# Patient Record
Sex: FEMALE | Race: WHITE | NOT HISPANIC OR LATINO | Employment: UNEMPLOYED | URBAN - NONMETROPOLITAN AREA
[De-identification: names, ages, dates, MRNs, and addresses within clinical notes are randomized per-mention and may not be internally consistent; named-entity substitution may affect disease eponyms.]

---

## 2018-03-11 PROBLEM — R07.9 CHEST PAIN: Status: ACTIVE | Noted: 2018-03-11

## 2018-03-12 PROBLEM — R07.9 CHEST PAIN: Status: RESOLVED | Noted: 2018-03-11 | Resolved: 2018-03-12

## 2019-01-25 ENCOUNTER — TRANSCRIBE ORDERS (OUTPATIENT)
Dept: ADMINISTRATIVE | Facility: HOSPITAL | Age: 64
End: 2019-01-25

## 2019-01-25 DIAGNOSIS — N28.89 LEFT KIDNEY MASS: Primary | ICD-10-CM

## 2019-02-08 ENCOUNTER — APPOINTMENT (OUTPATIENT)
Dept: CT IMAGING | Facility: HOSPITAL | Age: 64
End: 2019-02-08

## 2019-02-22 ENCOUNTER — APPOINTMENT (OUTPATIENT)
Dept: CT IMAGING | Facility: HOSPITAL | Age: 64
End: 2019-02-22

## 2019-12-26 ENCOUNTER — APPOINTMENT (OUTPATIENT)
Dept: CT IMAGING | Facility: HOSPITAL | Age: 64
End: 2019-12-26

## 2019-12-26 ENCOUNTER — APPOINTMENT (OUTPATIENT)
Dept: GENERAL RADIOLOGY | Facility: HOSPITAL | Age: 64
End: 2019-12-26

## 2019-12-26 ENCOUNTER — HOSPITAL ENCOUNTER (EMERGENCY)
Facility: HOSPITAL | Age: 64
Discharge: HOME | End: 2019-12-26
Attending: FAMILY MEDICINE | Admitting: FAMILY MEDICINE

## 2019-12-26 VITALS
HEIGHT: 63 IN | RESPIRATION RATE: 16 BRPM | DIASTOLIC BLOOD PRESSURE: 73 MMHG | SYSTOLIC BLOOD PRESSURE: 128 MMHG | OXYGEN SATURATION: 99 % | WEIGHT: 165 LBS | HEART RATE: 73 BPM | TEMPERATURE: 98.2 F | BODY MASS INDEX: 29.23 KG/M2

## 2019-12-26 DIAGNOSIS — G89.18 POST-OP PAIN: Primary | ICD-10-CM

## 2019-12-26 DIAGNOSIS — R07.89 CHEST WALL PAIN: ICD-10-CM

## 2019-12-26 LAB
ALBUMIN SERPL-MCNC: 4.49 G/DL (ref 3.5–5.2)
ALBUMIN/GLOB SERPL: 1.2 G/DL
ALP SERPL-CCNC: 61 U/L (ref 39–117)
ALT SERPL W P-5'-P-CCNC: 26 U/L (ref 1–33)
ANION GAP SERPL CALCULATED.3IONS-SCNC: 13 MMOL/L (ref 5–15)
AST SERPL-CCNC: 23 U/L (ref 1–32)
BASOPHILS # BLD AUTO: 0.05 10*3/MM3 (ref 0–0.2)
BASOPHILS NFR BLD AUTO: 0.9 % (ref 0–1.5)
BILIRUB SERPL-MCNC: 0.2 MG/DL (ref 0.2–1.2)
BUN BLD-MCNC: 22 MG/DL (ref 8–23)
BUN/CREAT SERPL: 21.2 (ref 7–25)
CALCIUM SPEC-SCNC: 10.1 MG/DL (ref 8.6–10.5)
CHLORIDE SERPL-SCNC: 101 MMOL/L (ref 98–107)
CK SERPL-CCNC: 29 U/L (ref 20–180)
CO2 SERPL-SCNC: 27 MMOL/L (ref 22–29)
CREAT BLD-MCNC: 1.04 MG/DL (ref 0.57–1)
DEPRECATED RDW RBC AUTO: 42 FL (ref 37–54)
EOSINOPHIL # BLD AUTO: 0.38 10*3/MM3 (ref 0–0.4)
EOSINOPHIL NFR BLD AUTO: 6.8 % (ref 0.3–6.2)
ERYTHROCYTE [DISTWIDTH] IN BLOOD BY AUTOMATED COUNT: 12.2 % (ref 12.3–15.4)
GFR SERPL CREATININE-BSD FRML MDRD: 54 ML/MIN/1.73
GLOBULIN UR ELPH-MCNC: 3.6 GM/DL
GLUCOSE BLD-MCNC: 128 MG/DL (ref 65–99)
HCT VFR BLD AUTO: 40.7 % (ref 34–46.6)
HGB BLD-MCNC: 12.8 G/DL (ref 12–15.9)
HOLD SPECIMEN: NORMAL
HOLD SPECIMEN: NORMAL
IMM GRANULOCYTES # BLD AUTO: 0.01 10*3/MM3 (ref 0–0.05)
IMM GRANULOCYTES NFR BLD AUTO: 0.2 % (ref 0–0.5)
LYMPHOCYTES # BLD AUTO: 1.8 10*3/MM3 (ref 0.7–3.1)
LYMPHOCYTES NFR BLD AUTO: 32.1 % (ref 19.6–45.3)
MAGNESIUM SERPL-MCNC: 1.8 MG/DL (ref 1.6–2.4)
MCH RBC QN AUTO: 29.2 PG (ref 26.6–33)
MCHC RBC AUTO-ENTMCNC: 31.4 G/DL (ref 31.5–35.7)
MCV RBC AUTO: 92.9 FL (ref 79–97)
MONOCYTES # BLD AUTO: 0.34 10*3/MM3 (ref 0.1–0.9)
MONOCYTES NFR BLD AUTO: 6.1 % (ref 5–12)
NEUTROPHILS # BLD AUTO: 3.02 10*3/MM3 (ref 1.7–7)
NEUTROPHILS NFR BLD AUTO: 53.9 % (ref 42.7–76)
NRBC BLD AUTO-RTO: 0 /100 WBC (ref 0–0.2)
NT-PROBNP SERPL-MCNC: 200.9 PG/ML (ref 5–900)
PLATELET # BLD AUTO: 364 10*3/MM3 (ref 140–450)
PMV BLD AUTO: 10 FL (ref 6–12)
POTASSIUM BLD-SCNC: 4.5 MMOL/L (ref 3.5–5.2)
PROT SERPL-MCNC: 8.1 G/DL (ref 6–8.5)
RBC # BLD AUTO: 4.38 10*6/MM3 (ref 3.77–5.28)
SODIUM BLD-SCNC: 141 MMOL/L (ref 136–145)
TROPONIN T SERPL-MCNC: <0.01 NG/ML (ref 0–0.03)
TROPONIN T SERPL-MCNC: <0.01 NG/ML (ref 0–0.03)
TSH SERPL DL<=0.05 MIU/L-ACNC: 0.9 UIU/ML (ref 0.27–4.2)
WBC NRBC COR # BLD: 5.6 10*3/MM3 (ref 3.4–10.8)
WHOLE BLOOD HOLD SPECIMEN: NORMAL
WHOLE BLOOD HOLD SPECIMEN: NORMAL

## 2019-12-26 PROCEDURE — 85025 COMPLETE CBC W/AUTO DIFF WBC: CPT | Performed by: FAMILY MEDICINE

## 2019-12-26 PROCEDURE — 25010000002 MORPHINE PER 10 MG: Performed by: FAMILY MEDICINE

## 2019-12-26 PROCEDURE — 96374 THER/PROPH/DIAG INJ IV PUSH: CPT

## 2019-12-26 PROCEDURE — 80053 COMPREHEN METABOLIC PANEL: CPT | Performed by: FAMILY MEDICINE

## 2019-12-26 PROCEDURE — 96376 TX/PRO/DX INJ SAME DRUG ADON: CPT

## 2019-12-26 PROCEDURE — 83735 ASSAY OF MAGNESIUM: CPT | Performed by: FAMILY MEDICINE

## 2019-12-26 PROCEDURE — 83880 ASSAY OF NATRIURETIC PEPTIDE: CPT | Performed by: FAMILY MEDICINE

## 2019-12-26 PROCEDURE — 96375 TX/PRO/DX INJ NEW DRUG ADDON: CPT

## 2019-12-26 PROCEDURE — 84484 ASSAY OF TROPONIN QUANT: CPT | Performed by: FAMILY MEDICINE

## 2019-12-26 PROCEDURE — 25010000002 ONDANSETRON PER 1 MG: Performed by: FAMILY MEDICINE

## 2019-12-26 PROCEDURE — 93005 ELECTROCARDIOGRAM TRACING: CPT | Performed by: FAMILY MEDICINE

## 2019-12-26 PROCEDURE — 93010 ELECTROCARDIOGRAM REPORT: CPT | Performed by: INTERNAL MEDICINE

## 2019-12-26 PROCEDURE — 71046 X-RAY EXAM CHEST 2 VIEWS: CPT | Performed by: RADIOLOGY

## 2019-12-26 PROCEDURE — 71260 CT THORAX DX C+: CPT

## 2019-12-26 PROCEDURE — 71260 CT THORAX DX C+: CPT | Performed by: RADIOLOGY

## 2019-12-26 PROCEDURE — 82550 ASSAY OF CK (CPK): CPT | Performed by: FAMILY MEDICINE

## 2019-12-26 PROCEDURE — 0 IOVERSOL 74 % SOLUTION: Performed by: FAMILY MEDICINE

## 2019-12-26 PROCEDURE — 71046 X-RAY EXAM CHEST 2 VIEWS: CPT

## 2019-12-26 PROCEDURE — 99284 EMERGENCY DEPT VISIT MOD MDM: CPT

## 2019-12-26 PROCEDURE — 84443 ASSAY THYROID STIM HORMONE: CPT | Performed by: FAMILY MEDICINE

## 2019-12-26 RX ORDER — MORPHINE SULFATE 2 MG/ML
2 INJECTION, SOLUTION INTRAMUSCULAR; INTRAVENOUS ONCE
Status: COMPLETED | OUTPATIENT
Start: 2019-12-26 | End: 2019-12-26

## 2019-12-26 RX ORDER — OXYCODONE HYDROCHLORIDE AND ACETAMINOPHEN 5; 325 MG/1; MG/1
1 TABLET ORAL ONCE
Status: COMPLETED | OUTPATIENT
Start: 2019-12-26 | End: 2019-12-26

## 2019-12-26 RX ORDER — ONDANSETRON 2 MG/ML
4 INJECTION INTRAMUSCULAR; INTRAVENOUS ONCE
Status: COMPLETED | OUTPATIENT
Start: 2019-12-26 | End: 2019-12-26

## 2019-12-26 RX ORDER — ASPIRIN 325 MG
325 TABLET ORAL ONCE
Status: COMPLETED | OUTPATIENT
Start: 2019-12-26 | End: 2019-12-26

## 2019-12-26 RX ORDER — SODIUM CHLORIDE 0.9 % (FLUSH) 0.9 %
10 SYRINGE (ML) INJECTION AS NEEDED
Status: DISCONTINUED | OUTPATIENT
Start: 2019-12-26 | End: 2019-12-26 | Stop reason: HOSPADM

## 2019-12-26 RX ORDER — HYDROCODONE BITARTRATE AND ACETAMINOPHEN 7.5; 325 MG/1; MG/1
1 TABLET ORAL EVERY 6 HOURS PRN
Qty: 6 TABLET | Refills: 0 | Status: SHIPPED | OUTPATIENT
Start: 2019-12-26

## 2019-12-26 RX ADMIN — OXYCODONE HYDROCHLORIDE AND ACETAMINOPHEN 1 TABLET: 5; 325 TABLET ORAL at 19:27

## 2019-12-26 RX ADMIN — ASPIRIN 325 MG: 325 TABLET ORAL at 16:29

## 2019-12-26 RX ADMIN — ONDANSETRON 4 MG: 2 INJECTION INTRAMUSCULAR; INTRAVENOUS at 16:29

## 2019-12-26 RX ADMIN — MORPHINE SULFATE 2 MG: 2 INJECTION, SOLUTION INTRAMUSCULAR; INTRAVENOUS at 17:40

## 2019-12-26 RX ADMIN — IOVERSOL 90 ML: 741 INJECTION INTRA-ARTERIAL; INTRAVENOUS at 18:38

## 2019-12-26 RX ADMIN — MORPHINE SULFATE 2 MG: 2 INJECTION, SOLUTION INTRAMUSCULAR; INTRAVENOUS at 16:29

## 2022-04-22 ENCOUNTER — HOSPITAL ENCOUNTER (EMERGENCY)
Age: 67
Discharge: HOME | End: 2022-04-22
Attending: EMERGENCY MEDICINE
Payer: COMMERCIAL

## 2022-04-22 VITALS
RESPIRATION RATE: 18 BRPM | DIASTOLIC BLOOD PRESSURE: 95 MMHG | BODY MASS INDEX: 30.12 KG/M2 | HEART RATE: 95 BPM | TEMPERATURE: 98.4 F | WEIGHT: 170 LBS | SYSTOLIC BLOOD PRESSURE: 125 MMHG | HEIGHT: 63 IN | OXYGEN SATURATION: 99 %

## 2022-04-22 DIAGNOSIS — N30.00 ACUTE CYSTITIS WITHOUT HEMATURIA: Primary | ICD-10-CM

## 2022-04-22 LAB
AMORPHOUS: ABNORMAL /HPF
BACTERIA: ABNORMAL /HPF
BILIRUBIN URINE: NEGATIVE
BLOOD, URINE: ABNORMAL
CLARITY: CLEAR
COLOR: YELLOW
EPITHELIAL CELLS, UA: ABNORMAL /HPF (ref 0–5)
GLUCOSE URINE: NEGATIVE MG/DL
KETONES, URINE: NEGATIVE MG/DL
LEUKOCYTE ESTERASE, URINE: ABNORMAL
MICROSCOPIC EXAMINATION: YES
NITRITE, URINE: NEGATIVE
PH UA: 5.5 (ref 5–8)
PROTEIN UA: NEGATIVE MG/DL
RBC UA: ABNORMAL /HPF (ref 0–4)
SPECIFIC GRAVITY UA: >=1.03 (ref 1–1.03)
URINE REFLEX TO CULTURE: YES
URINE TYPE: ABNORMAL
UROBILINOGEN, URINE: 0.2 E.U./DL
WBC UA: ABNORMAL /HPF (ref 0–5)

## 2022-04-22 PROCEDURE — 99283 EMERGENCY DEPT VISIT LOW MDM: CPT

## 2022-04-22 PROCEDURE — 81001 URINALYSIS AUTO W/SCOPE: CPT

## 2022-04-22 PROCEDURE — 6370000000 HC RX 637 (ALT 250 FOR IP): Performed by: EMERGENCY MEDICINE

## 2022-04-22 RX ORDER — CEPHALEXIN 500 MG/1
500 CAPSULE ORAL 2 TIMES DAILY
Qty: 10 CAPSULE | Refills: 0 | Status: SHIPPED | OUTPATIENT
Start: 2022-04-22 | End: 2022-04-27

## 2022-04-22 RX ORDER — CEPHALEXIN 500 MG/1
500 CAPSULE ORAL ONCE
Status: COMPLETED | OUTPATIENT
Start: 2022-04-22 | End: 2022-04-22

## 2022-04-22 RX ADMIN — CEPHALEXIN 500 MG: 500 CAPSULE ORAL at 14:14

## 2022-04-22 ASSESSMENT — ENCOUNTER SYMPTOMS
COUGH: 0
BACK PAIN: 0
SORE THROAT: 0
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
## 2022-04-22 NOTE — ED PROVIDER NOTES
[address] ENCOUNTER          ATTENDING PHYSICIAN NOTE       Date of evaluation: 4/22/2022    Chief Complaint     Dysuria and Abdominal Pain      History of Present Illness     Jayde Machuca is a 73 y.o. female who presents to the emergency department complaining of mild constant nonradiating suprapubic discomfort of roughly a weeks duration, associated with urinary urgency, frequency, and burning with urination. No flank pain. No nausea vomiting. No fevers or chills. No vaginal discharge or vaginal bleeding. Otherwise in her usual state of health. Review of Systems     Review of Systems   HENT: Negative for sore throat. Respiratory: Negative for cough. Cardiovascular: Negative for chest pain. Musculoskeletal: Negative for back pain and myalgias. Neurological: Negative for dizziness. All other systems reviewed and are negative. Past Medical, Surgical, Family, and Social History     She has a past medical history of Anxiety, Chronic kidney disease, stage 3, Chronic neck pain, Drug abuse, GERD (gastroesophageal reflux disease), Glucose intolerance (impaired glucose tolerance), Hiatal hernia, Hyperlipidemia, Hypertension, Opiate addiction ([address] Ventricular fibrillation ([address] and White coat hypertension. She has a past surgical history that includes A-V cardiac pacemaker insertion (12/2011); cervical fusion (2010); Cholecystectomy (6/2012); [place] tooth extraction; and laparotomy (2010). Her family history includes Asthma in her maternal aunt; Cancer in her father; Depression in her maternal grandfather; Diabetes in her mother; Heart Disease in her mother; High Blood Pressure in her maternal aunt, mother, and sister. She reports that she has never smoked. She has never used smokeless tobacco. She reports that she does not drink alcohol and does not use drugs.     Medications     Previous Medications    AMLODIPINE (NORVASC) 5 MG TABLET    Take 1 tablet by mouth daily    BUSPIRONE (BUSPAR) 10 MG TABLET    TAKE 1 TABLET BY MOUTH 3 TIMES A DAY    CLONIDINE (CATAPRES) 0.3 MG TABLET    Take 1 tablet by mouth 2 times daily. FENOFIBRATE (TRICOR) 145 MG TABLET    TAKE 1 TABLET BY MOUTH DAILY. GABAPENTIN (NEURONTIN) 300 MG CAPSULE    Take 1 capsule by mouth 3 times daily. IBUPROFEN (ADVIL;MOTRIN) 600 MG TABLET    Take 1 tablet by mouth every 6 hours as needed for Pain    LISINOPRIL (PRINIVIL;ZESTRIL) 20 MG TABLET    Take 25 mg by mouth daily       Allergies     She is allergic to darvocet [propoxyphene n-acetaminophen], sulfa antibiotics, and ultram [tramadol hcl]. Physical Exam     INITIAL VITALS: BP (!) 124/109   Pulse 94   Temp 98.4 °F (36.9 °C) (Oral)   Resp 17   Ht 5' 3\" (1.6 m)   Wt 170 lb (77.1 kg)   SpO2 94%   BMI 30.11 kg/m²    General: Well developed, well nourished female in no acute distress. HEENT: Sclera white, conjunctiva pink, mucous membranes moist and oropharynx clear  Neck: Supple, no meningismus or JVD  Respirations: Unlabored with symmetric chest rise and fall  CV: Regular rate and rhythm with strong distal pulses  Abd: Mild suprapubic tenderness to deep palpation without rebound guarding or distention  : No CVA tenderness  Musculoskeletal: Moves all extremities with no signs of orthopedic trauma or edema. Skin: Warm and dry with no rashes or lesions. Neuro: Awake and alert with no focal neurologic deficits. Normal speech and gait.   Psych: Mood and affect are normal.    Diagnostic Results     LABS:   Results for orders placed or performed during the hospital encounter of 04/22/22   Urinalysis with Reflex to Culture    Specimen: Urine   Result Value Ref Range    Color, UA Yellow Straw/Yellow    Clarity, UA Clear Clear    Glucose, Ur Negative Negative mg/dL    Bilirubin Urine Negative Negative    Ketones, Urine Negative Negative mg/dL    Specific Gravity, UA >=1.030 1.005 - 1.030    Blood, Urine SMALL (A) Negative    pH, UA 5.5 5.0 - 8.0    Protein, UA Negative Negative mg/dL    Urobilinogen, Urine 0.2 <2.0 E.U./dL    Nitrite, Urine Negative Negative    Leukocyte Esterase, Urine MODERATE (A) Negative    Microscopic Examination YES     Urine Type NotGiven     Urine Reflex to Culture Yes    Microscopic Urinalysis   Result Value Ref Range    WBC, UA 21-50 (A) 0 - 5 /HPF    RBC, UA 3-4 0 - 4 /HPF    Epithelial Cells, UA 6-10 (A) 0 - 5 /HPF    Bacteria, UA 4+ (A) None Seen /HPF    Amorphous, UA 1+ /HPF       ED Course     Nursing Notes, Past Medical Hx, Past Surgical Hx, Social Hx, Allergies, and Family Hx were reviewed. The patient was given the following medications:  Orders Placed This Encounter   Medications    cephALEXin (KEFLEX) capsule 500 mg     Order Specific Question:   Antimicrobial Indications     Answer:   Urinary Tract Infection    cephALEXin (KEFLEX) 500 MG capsule     Sig: Take 1 capsule by mouth 2 times daily for 5 days     Dispense:  10 capsule     Refill:  0     She was seen and examined on arrival to the treatment area. Results explained, aftercare and return precautions discussed. First dose of antibiotics given in the emergency department. MEDICAL DECISION MAKING / ASSESSMENT / Vandana Dwayne is a 73 y.o. female presents to the emergency department with signs and symptoms consistent with urinary tract infection. No evidence of pyelonephritis. She denies vaginal symptoms. I feel she is stable for discharge home with oral antibiotics and appropriate return precautions. No old cultures available for comparison, and she will be started on Keflex based on her allergies. Clinical Impression     1.  Acute cystitis without hematuria        Disposition     PAT[facility]:  [facility]  [address]  [address]  [phone]    As needed, If symptoms worsen      DISCHARGE MEDICATIONS:  New Prescriptions    CEPHALEXIN (KEFLEX) 500 MG CAPSULE    Take 1 capsule by mouth 2 times daily for 5 days       DISPOSITION Discharge - Pending Orders Complete 04/22/2022 02:10:26 PM          Mojgan uRffin MD  04/22/22 7918
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
## 2022-09-28 ENCOUNTER — HOSPITAL ENCOUNTER (INPATIENT)
Age: 67
LOS: 2 days | Discharge: HOME | DRG: 880 | End: 2022-10-01
Attending: STUDENT IN AN ORGANIZED HEALTH CARE EDUCATION/TRAINING PROGRAM | Admitting: STUDENT IN AN ORGANIZED HEALTH CARE EDUCATION/TRAINING PROGRAM
Payer: MEDICARE

## 2022-09-28 ENCOUNTER — APPOINTMENT (OUTPATIENT)
Dept: GENERAL RADIOLOGY | Age: 67
DRG: 880 | End: 2022-09-28
Payer: MEDICARE

## 2022-09-28 DIAGNOSIS — F41.1 ANXIETY STATE: ICD-10-CM

## 2022-09-28 DIAGNOSIS — R07.9 ACUTE CHEST PAIN: Primary | ICD-10-CM

## 2022-09-28 LAB
A/G RATIO: 1.3 (ref 1.1–2.2)
ALBUMIN SERPL-MCNC: 4.1 G/DL (ref 3.4–5)
ALP BLD-CCNC: 60 U/L (ref 40–129)
ALT SERPL-CCNC: 17 U/L (ref 10–40)
ANION GAP SERPL CALCULATED.3IONS-SCNC: 12 MMOL/L (ref 3–16)
AST SERPL-CCNC: 18 U/L (ref 15–37)
BASOPHILS ABSOLUTE: 0 K/UL (ref 0–0.2)
BASOPHILS RELATIVE PERCENT: 0.4 %
BILIRUB SERPL-MCNC: 0.4 MG/DL (ref 0–1)
BUN BLDV-MCNC: 21 MG/DL (ref 7–20)
CALCIUM SERPL-MCNC: 9.5 MG/DL (ref 8.3–10.6)
CHLORIDE BLD-SCNC: 102 MMOL/L (ref 99–110)
CO2: 24 MMOL/L (ref 21–32)
CREAT SERPL-MCNC: 1.1 MG/DL (ref 0.6–1.2)
EOSINOPHILS ABSOLUTE: 0 K/UL (ref 0–0.6)
EOSINOPHILS RELATIVE PERCENT: 0.2 %
GFR AFRICAN AMERICAN: >60
GFR NON-AFRICAN AMERICAN: 50
GLUCOSE BLD-MCNC: 186 MG/DL (ref 70–99)
HCT VFR BLD CALC: 38.4 % (ref 36–48)
HEMOGLOBIN: 13.1 G/DL (ref 12–16)
LIPASE: 21 U/L (ref 13–60)
LYMPHOCYTES ABSOLUTE: 0.9 K/UL (ref 1–5.1)
LYMPHOCYTES RELATIVE PERCENT: 9.1 %
MCH RBC QN AUTO: 30.5 PG (ref 26–34)
MCHC RBC AUTO-ENTMCNC: 34.1 G/DL (ref 31–36)
MCV RBC AUTO: 89.5 FL (ref 80–100)
MONOCYTES ABSOLUTE: 0.6 K/UL (ref 0–1.3)
MONOCYTES RELATIVE PERCENT: 6 %
NEUTROPHILS ABSOLUTE: 8.6 K/UL (ref 1.7–7.7)
NEUTROPHILS RELATIVE PERCENT: 84.3 %
PDW BLD-RTO: 13 % (ref 12.4–15.4)
PLATELET # BLD: 368 K/UL (ref 135–450)
PMV BLD AUTO: 8.1 FL (ref 5–10.5)
POTASSIUM SERPL-SCNC: 3.7 MMOL/L (ref 3.5–5.1)
PRO-BNP: 392 PG/ML (ref 0–124)
RBC # BLD: 4.29 M/UL (ref 4–5.2)
SODIUM BLD-SCNC: 138 MMOL/L (ref 136–145)
TOTAL PROTEIN: 7.2 G/DL (ref 6.4–8.2)
TROPONIN: <0.01 NG/ML
WBC # BLD: 10.2 K/UL (ref 4–11)

## 2022-09-28 PROCEDURE — 36415 COLL VENOUS BLD VENIPUNCTURE: CPT

## 2022-09-28 PROCEDURE — 99285 EMERGENCY DEPT VISIT HI MDM: CPT

## 2022-09-28 PROCEDURE — 83690 ASSAY OF LIPASE: CPT

## 2022-09-28 PROCEDURE — 84484 ASSAY OF TROPONIN QUANT: CPT

## 2022-09-28 PROCEDURE — 80053 COMPREHEN METABOLIC PANEL: CPT

## 2022-09-28 PROCEDURE — 93005 ELECTROCARDIOGRAM TRACING: CPT | Performed by: STUDENT IN AN ORGANIZED HEALTH CARE EDUCATION/TRAINING PROGRAM

## 2022-09-28 PROCEDURE — 83880 ASSAY OF NATRIURETIC PEPTIDE: CPT

## 2022-09-28 PROCEDURE — 6370000000 HC RX 637 (ALT 250 FOR IP): Performed by: PHYSICIAN ASSISTANT

## 2022-09-28 PROCEDURE — 85025 COMPLETE CBC W/AUTO DIFF WBC: CPT

## 2022-09-28 PROCEDURE — 71045 X-RAY EXAM CHEST 1 VIEW: CPT

## 2022-09-28 RX ORDER — ATORVASTATIN CALCIUM 20 MG/1
20 TABLET, FILM COATED ORAL DAILY
Status: ON HOLD | COMMUNITY
End: 2022-10-01 | Stop reason: HOSPADM

## 2022-09-28 RX ORDER — DIAZEPAM 2 MG/1
2 TABLET ORAL ONCE
Status: COMPLETED | OUTPATIENT
Start: 2022-09-28 | End: 2022-09-28

## 2022-09-28 RX ADMIN — DIAZEPAM 2 MG: 2 TABLET ORAL at 23:03

## 2022-09-28 RX ADMIN — NITROGLYCERIN 1 INCH: 20 OINTMENT TOPICAL at 23:03

## 2022-09-28 ASSESSMENT — HEART SCORE: ECG: 1

## 2022-09-28 ASSESSMENT — ENCOUNTER SYMPTOMS
BACK PAIN: 0
VOMITING: 0
STRIDOR: 0
ABDOMINAL PAIN: 0
DIARRHEA: 0
COUGH: 0
COLOR CHANGE: 0
SHORTNESS OF BREATH: 1
CONSTIPATION: 0
WHEEZING: 0
NAUSEA: 0

## 2022-09-28 ASSESSMENT — PAIN - FUNCTIONAL ASSESSMENT: PAIN_FUNCTIONAL_ASSESSMENT: 0-10

## 2022-09-28 ASSESSMENT — PAIN SCALES - GENERAL
PAINLEVEL_OUTOF10: 6
PAINLEVEL_OUTOF10: 4

## 2022-09-29 LAB
AMPHETAMINE SCREEN, URINE: NORMAL
BACTERIA: ABNORMAL /HPF
BARBITURATE SCREEN URINE: NORMAL
BENZODIAZEPINE SCREEN, URINE: NORMAL
BILIRUBIN URINE: NEGATIVE
BLOOD, URINE: ABNORMAL
CANNABINOID SCREEN URINE: NORMAL
CHOLESTEROL, TOTAL: 163 MG/DL (ref 0–199)
CLARITY: ABNORMAL
COCAINE METABOLITE SCREEN URINE: NORMAL
COLOR: YELLOW
COMMENT UA: ABNORMAL
CRYSTALS, UA: ABNORMAL /HPF
EKG ATRIAL RATE: 70 BPM
EKG ATRIAL RATE: 92 BPM
EKG DIAGNOSIS: NORMAL
EKG DIAGNOSIS: NORMAL
EKG P AXIS: 33 DEGREES
EKG P AXIS: 48 DEGREES
EKG P-R INTERVAL: 142 MS
EKG P-R INTERVAL: 156 MS
EKG Q-T INTERVAL: 370 MS
EKG Q-T INTERVAL: 446 MS
EKG QRS DURATION: 72 MS
EKG QRS DURATION: 74 MS
EKG QTC CALCULATION (BAZETT): 457 MS
EKG QTC CALCULATION (BAZETT): 481 MS
EKG R AXIS: 17 DEGREES
EKG R AXIS: 26 DEGREES
EKG T AXIS: 23 DEGREES
EKG T AXIS: 25 DEGREES
EKG VENTRICULAR RATE: 70 BPM
EKG VENTRICULAR RATE: 92 BPM
EPITHELIAL CELLS, UA: 6 /HPF (ref 0–5)
ESTIMATED AVERAGE GLUCOSE: 131.2 MG/DL
FENTANYL SCREEN, URINE: NORMAL
GLUCOSE BLD-MCNC: 124 MG/DL (ref 70–99)
GLUCOSE BLD-MCNC: 127 MG/DL (ref 70–99)
GLUCOSE BLD-MCNC: 149 MG/DL (ref 70–99)
GLUCOSE URINE: 100 MG/DL
HBA1C MFR BLD: 6.2 %
HDLC SERPL-MCNC: 42 MG/DL (ref 40–60)
HYALINE CASTS: 0 /LPF (ref 0–8)
KETONES, URINE: NEGATIVE MG/DL
LACTIC ACID: 2.8 MMOL/L (ref 0.4–2)
LDL CHOLESTEROL CALCULATED: 102 MG/DL
LEUKOCYTE ESTERASE, URINE: ABNORMAL
LV EF: 63 %
LVEF MODALITY: NORMAL
Lab: NORMAL
MAGNESIUM: 1.9 MG/DL (ref 1.8–2.4)
METHADONE SCREEN, URINE: NORMAL
MICROSCOPIC EXAMINATION: YES
NITRITE, URINE: NEGATIVE
OPIATE SCREEN URINE: NORMAL
OXYCODONE URINE: NORMAL
PERFORMED ON: ABNORMAL
PH UA: 5
PH UA: 5 (ref 5–8)
PHENCYCLIDINE SCREEN URINE: NORMAL
PROTEIN UA: NEGATIVE MG/DL
RBC UA: 1 /HPF (ref 0–4)
SPECIFIC GRAVITY UA: 1.02 (ref 1–1.03)
TRIGL SERPL-MCNC: 95 MG/DL (ref 0–150)
TROPONIN: <0.01 NG/ML
TROPONIN: <0.01 NG/ML
TSH REFLEX: 1 UIU/ML (ref 0.27–4.2)
URINE REFLEX TO CULTURE: YES
URINE TYPE: ABNORMAL
UROBILINOGEN, URINE: 0.2 E.U./DL
VLDLC SERPL CALC-MCNC: 19 MG/DL
WBC UA: 31 /HPF (ref 0–5)

## 2022-09-29 PROCEDURE — 87086 URINE CULTURE/COLONY COUNT: CPT

## 2022-09-29 PROCEDURE — 83605 ASSAY OF LACTIC ACID: CPT

## 2022-09-29 PROCEDURE — 93306 TTE W/DOPPLER COMPLETE: CPT

## 2022-09-29 PROCEDURE — 6370000000 HC RX 637 (ALT 250 FOR IP): Performed by: STUDENT IN AN ORGANIZED HEALTH CARE EDUCATION/TRAINING PROGRAM

## 2022-09-29 PROCEDURE — 80307 DRUG TEST PRSMV CHEM ANLYZR: CPT

## 2022-09-29 PROCEDURE — 84484 ASSAY OF TROPONIN QUANT: CPT

## 2022-09-29 PROCEDURE — 6360000002 HC RX W HCPCS: Performed by: STUDENT IN AN ORGANIZED HEALTH CARE EDUCATION/TRAINING PROGRAM

## 2022-09-29 PROCEDURE — 93010 ELECTROCARDIOGRAM REPORT: CPT | Performed by: INTERNAL MEDICINE

## 2022-09-29 PROCEDURE — 83735 ASSAY OF MAGNESIUM: CPT

## 2022-09-29 PROCEDURE — 83036 HEMOGLOBIN GLYCOSYLATED A1C: CPT

## 2022-09-29 PROCEDURE — 80061 LIPID PANEL: CPT

## 2022-09-29 PROCEDURE — 93005 ELECTROCARDIOGRAM TRACING: CPT | Performed by: STUDENT IN AN ORGANIZED HEALTH CARE EDUCATION/TRAINING PROGRAM

## 2022-09-29 PROCEDURE — 81001 URINALYSIS AUTO W/SCOPE: CPT

## 2022-09-29 PROCEDURE — 84443 ASSAY THYROID STIM HORMONE: CPT

## 2022-09-29 PROCEDURE — 2580000003 HC RX 258: Performed by: STUDENT IN AN ORGANIZED HEALTH CARE EDUCATION/TRAINING PROGRAM

## 2022-09-29 PROCEDURE — 36415 COLL VENOUS BLD VENIPUNCTURE: CPT

## 2022-09-29 PROCEDURE — 1200000000 HC SEMI PRIVATE

## 2022-09-29 RX ORDER — DIAZEPAM 2 MG/1
2 TABLET ORAL EVERY 8 HOURS PRN
Status: DISCONTINUED | OUTPATIENT
Start: 2022-09-29 | End: 2022-10-01 | Stop reason: HOSPADM

## 2022-09-29 RX ORDER — LISINOPRIL 10 MG/1
25 TABLET ORAL DAILY
Status: DISCONTINUED | OUTPATIENT
Start: 2022-09-29 | End: 2022-09-29

## 2022-09-29 RX ORDER — MAGNESIUM SULFATE IN WATER 40 MG/ML
2000 INJECTION, SOLUTION INTRAVENOUS PRN
Status: DISCONTINUED | OUTPATIENT
Start: 2022-09-29 | End: 2022-10-01 | Stop reason: HOSPADM

## 2022-09-29 RX ORDER — ATORVASTATIN CALCIUM 40 MG/1
40 TABLET, FILM COATED ORAL NIGHTLY
Status: DISCONTINUED | OUTPATIENT
Start: 2022-09-29 | End: 2022-10-01 | Stop reason: HOSPADM

## 2022-09-29 RX ORDER — SODIUM CHLORIDE 9 MG/ML
INJECTION, SOLUTION INTRAVENOUS PRN
Status: DISCONTINUED | OUTPATIENT
Start: 2022-09-29 | End: 2022-10-01 | Stop reason: HOSPADM

## 2022-09-29 RX ORDER — ONDANSETRON 2 MG/ML
4 INJECTION INTRAMUSCULAR; INTRAVENOUS EVERY 6 HOURS PRN
Status: DISCONTINUED | OUTPATIENT
Start: 2022-09-29 | End: 2022-10-01 | Stop reason: HOSPADM

## 2022-09-29 RX ORDER — POTASSIUM CHLORIDE 7.45 MG/ML
10 INJECTION INTRAVENOUS PRN
Status: DISCONTINUED | OUTPATIENT
Start: 2022-09-29 | End: 2022-10-01 | Stop reason: HOSPADM

## 2022-09-29 RX ORDER — ONDANSETRON 4 MG/1
4 TABLET, ORALLY DISINTEGRATING ORAL EVERY 8 HOURS PRN
Status: DISCONTINUED | OUTPATIENT
Start: 2022-09-29 | End: 2022-10-01 | Stop reason: HOSPADM

## 2022-09-29 RX ORDER — BUSPIRONE HYDROCHLORIDE 5 MG/1
10 TABLET ORAL 3 TIMES DAILY
Status: DISCONTINUED | OUTPATIENT
Start: 2022-09-29 | End: 2022-09-29

## 2022-09-29 RX ORDER — FENOFIBRATE 160 MG/1
160 TABLET ORAL DAILY
Refills: 5 | Status: DISCONTINUED | OUTPATIENT
Start: 2022-09-29 | End: 2022-10-01 | Stop reason: HOSPADM

## 2022-09-29 RX ORDER — LISINOPRIL 10 MG/1
20 TABLET ORAL DAILY
Status: DISCONTINUED | OUTPATIENT
Start: 2022-09-29 | End: 2022-10-01 | Stop reason: HOSPADM

## 2022-09-29 RX ORDER — POLYETHYLENE GLYCOL 3350 17 G/17G
17 POWDER, FOR SOLUTION ORAL DAILY PRN
Status: DISCONTINUED | OUTPATIENT
Start: 2022-09-29 | End: 2022-10-01 | Stop reason: HOSPADM

## 2022-09-29 RX ORDER — INSULIN LISPRO 100 [IU]/ML
0-8 INJECTION, SOLUTION INTRAVENOUS; SUBCUTANEOUS
Status: DISCONTINUED | OUTPATIENT
Start: 2022-09-29 | End: 2022-10-01 | Stop reason: HOSPADM

## 2022-09-29 RX ORDER — SODIUM CHLORIDE, SODIUM LACTATE, POTASSIUM CHLORIDE, CALCIUM CHLORIDE 600; 310; 30; 20 MG/100ML; MG/100ML; MG/100ML; MG/100ML
INJECTION, SOLUTION INTRAVENOUS CONTINUOUS
Status: DISCONTINUED | OUTPATIENT
Start: 2022-09-29 | End: 2022-09-30

## 2022-09-29 RX ORDER — ACETAMINOPHEN 325 MG/1
650 TABLET ORAL EVERY 6 HOURS PRN
Status: DISCONTINUED | OUTPATIENT
Start: 2022-09-29 | End: 2022-10-01 | Stop reason: HOSPADM

## 2022-09-29 RX ORDER — HYDROCHLOROTHIAZIDE 25 MG/1
25 TABLET ORAL DAILY
Status: DISCONTINUED | OUTPATIENT
Start: 2022-09-29 | End: 2022-10-01 | Stop reason: HOSPADM

## 2022-09-29 RX ORDER — ENOXAPARIN SODIUM 100 MG/ML
40 INJECTION SUBCUTANEOUS DAILY
Status: DISCONTINUED | OUTPATIENT
Start: 2022-09-29 | End: 2022-10-01 | Stop reason: HOSPADM

## 2022-09-29 RX ORDER — GABAPENTIN 300 MG/1
300 CAPSULE ORAL 3 TIMES DAILY
Status: DISCONTINUED | OUTPATIENT
Start: 2022-09-29 | End: 2022-10-01 | Stop reason: HOSPADM

## 2022-09-29 RX ORDER — CLONIDINE HYDROCHLORIDE 0.1 MG/1
0.3 TABLET ORAL 2 TIMES DAILY
Status: DISCONTINUED | OUTPATIENT
Start: 2022-09-29 | End: 2022-10-01 | Stop reason: HOSPADM

## 2022-09-29 RX ORDER — AMLODIPINE BESYLATE 5 MG/1
5 TABLET ORAL DAILY
Status: DISCONTINUED | OUTPATIENT
Start: 2022-09-29 | End: 2022-09-29

## 2022-09-29 RX ORDER — GABAPENTIN 600 MG/1
600 TABLET ORAL 2 TIMES DAILY
COMMUNITY

## 2022-09-29 RX ORDER — SODIUM CHLORIDE 0.9 % (FLUSH) 0.9 %
5-40 SYRINGE (ML) INJECTION EVERY 12 HOURS SCHEDULED
Status: DISCONTINUED | OUTPATIENT
Start: 2022-09-29 | End: 2022-10-01 | Stop reason: HOSPADM

## 2022-09-29 RX ORDER — INSULIN LISPRO 100 [IU]/ML
0-4 INJECTION, SOLUTION INTRAVENOUS; SUBCUTANEOUS NIGHTLY
Status: DISCONTINUED | OUTPATIENT
Start: 2022-09-29 | End: 2022-10-01 | Stop reason: HOSPADM

## 2022-09-29 RX ORDER — LISINOPRIL AND HYDROCHLOROTHIAZIDE 25; 20 MG/1; MG/1
1 TABLET ORAL DAILY
COMMUNITY

## 2022-09-29 RX ORDER — DEXTROSE MONOHYDRATE 100 MG/ML
INJECTION, SOLUTION INTRAVENOUS CONTINUOUS PRN
Status: DISCONTINUED | OUTPATIENT
Start: 2022-09-29 | End: 2022-10-01 | Stop reason: HOSPADM

## 2022-09-29 RX ORDER — ACETAMINOPHEN 650 MG/1
650 SUPPOSITORY RECTAL EVERY 6 HOURS PRN
Status: DISCONTINUED | OUTPATIENT
Start: 2022-09-29 | End: 2022-10-01 | Stop reason: HOSPADM

## 2022-09-29 RX ORDER — ATORVASTATIN CALCIUM 40 MG/1
40 TABLET, FILM COATED ORAL DAILY
COMMUNITY

## 2022-09-29 RX ORDER — PANTOPRAZOLE SODIUM 40 MG/1
40 TABLET, DELAYED RELEASE ORAL
Status: DISCONTINUED | OUTPATIENT
Start: 2022-09-29 | End: 2022-10-01 | Stop reason: HOSPADM

## 2022-09-29 RX ORDER — SODIUM CHLORIDE 0.9 % (FLUSH) 0.9 %
5-40 SYRINGE (ML) INJECTION PRN
Status: DISCONTINUED | OUTPATIENT
Start: 2022-09-29 | End: 2022-10-01 | Stop reason: HOSPADM

## 2022-09-29 RX ADMIN — DIAZEPAM 2 MG: 2 TABLET ORAL at 17:17

## 2022-09-29 RX ADMIN — CLONIDINE HYDROCHLORIDE 0.3 MG: 0.1 TABLET ORAL at 08:37

## 2022-09-29 RX ADMIN — FENOFIBRATE 160 MG: 160 TABLET ORAL at 08:38

## 2022-09-29 RX ADMIN — PANTOPRAZOLE SODIUM 40 MG: 40 TABLET, DELAYED RELEASE ORAL at 08:38

## 2022-09-29 RX ADMIN — Medication 10 ML: at 08:38

## 2022-09-29 RX ADMIN — CLONIDINE HYDROCHLORIDE 0.3 MG: 0.1 TABLET ORAL at 20:37

## 2022-09-29 RX ADMIN — SODIUM CHLORIDE, POTASSIUM CHLORIDE, SODIUM LACTATE AND CALCIUM CHLORIDE: 600; 310; 30; 20 INJECTION, SOLUTION INTRAVENOUS at 03:55

## 2022-09-29 RX ADMIN — GABAPENTIN 300 MG: 300 CAPSULE ORAL at 08:38

## 2022-09-29 RX ADMIN — ENOXAPARIN SODIUM 40 MG: 100 INJECTION SUBCUTANEOUS at 08:37

## 2022-09-29 RX ADMIN — GABAPENTIN 300 MG: 300 CAPSULE ORAL at 20:32

## 2022-09-29 RX ADMIN — ATORVASTATIN CALCIUM 40 MG: 40 TABLET, FILM COATED ORAL at 20:32

## 2022-09-29 RX ADMIN — LISINOPRIL 20 MG: 10 TABLET ORAL at 08:38

## 2022-09-29 RX ADMIN — HYDROCHLOROTHIAZIDE 25 MG: 25 TABLET ORAL at 08:37

## 2022-09-29 RX ADMIN — GABAPENTIN 300 MG: 300 CAPSULE ORAL at 14:35

## 2022-09-29 RX ADMIN — DIAZEPAM 2 MG: 2 TABLET ORAL at 08:38

## 2022-09-29 ASSESSMENT — PAIN SCALES - GENERAL
PAINLEVEL_OUTOF10: 0
PAINLEVEL_OUTOF10: 0

## 2022-09-29 NOTE — ED NOTES
Pt is on a continuous pulse oximetry and telemetry monitoring. Pt continued on cycling blood pressure. Fall risk precautions in place, call light in reach, bed side table within reach, bed alarm on, will continue to monitor.            Rafa Puga RN  09/29/22 0763

## 2022-09-29 NOTE — ED NOTES
Pt c/o dizziness. Dr ACOSTA [facility] paged.      James James RN  09/29/22 4007

## 2022-09-29 NOTE — H&P
Hospital Medicine History & Physical        Name:  Jordon Sood  /Age/Sex: 1955  (78 y.o. female)  MRN & CSN:  [id number] & [id number]     PCP: Anali Weinberg MD    Date of Admission: 2022    Date of Service: Pt seen/examined on 2022    Patient Status:  Inpatient - Patient will most likely require more than 48 hours of treatment and requires intensive medical treatment/monitoring     Chief Complaint:    Chief Complaint   Patient presents with    Chest Pain     Pt from home by EMS, pt had chest pain that started 4-5 hours ago. Pt states that chest pain pain feels tight. Pt states that hx of Afib and CHF. Pt has metronic pacemaker. EMS gave her 324 of aspirin and 2 nitro SL. History Of Present Illness:     78 y.o. female with PMHx of chronic kidney disease, history of drug abuse, tobacco abuse, hyperlipidemia, hypertension, s/p implanted defibrillator who presented to [facility] with complaints of chest pain. Patient states a few hours prior to admission she was experiencing chest pressure in the middle of her chest.  She also experienced heart palpitations like her heart was racing. After this went on for little while, patient started experience tremors. She denies shortness of breath, nausea, vomiting, GI/ symptoms. She is never had this chest pressure before, despite patient having history of MI 4 years ago. Dips 1/3 tin per day. No smoking hx. No alcohol use. No ilicit drugs.     Past Medical History:          Diagnosis Date    Anxiety     Chronic kidney disease, stage 3     Dr. Bettie Nunez    Chronic neck pain     Drug abuse 15    Methadone on drug screen / History Cocaine    GERD (gastroesophageal reflux disease)     Glucose intolerance (impaired glucose tolerance) 14    Hiatal hernia     Hyperlipidemia     Hypertension     White Coat    O[facility]ate [address] [address]     Ventricular fibrillation (H[facility])     S/P implanted defibrillator    White coat hypertension Past Surgical History:          Procedure Laterality Date    A-V CARDIAC PACEMAKER INSERTION  12/2011    CERVICAL FUSION  2010    CHOLECYSTECTOMY  6/2012    LAPAROTOMY  2010    WISDOM TOOTH EXTRACTION         Medications Prior to Admission:      Prior to Admission medications    Medication Sig Start Date End Date Taking? Authorizing Provider   atorvastatin (LIPITOR) 20 MG tablet Take 20 mg by mouth daily   Yes Historical Provider, MD   lisinopril (PRINIVIL;ZESTRIL) 20 MG tablet Take 25 mg by mouth daily    Historical Provider, MD   ibuprofen (ADVIL;MOTRIN) 600 MG tablet Take 1 tablet by mouth every 6 hours as needed for Pain 10/18/17   [facility]   busPIRone (BUSPAR) 10 MG tablet TAKE 1 TABLET BY MOUTH 3 TIMES A DAY 6/21/15   Allie Sours, DO   amLODIPine (NORVASC) 5 MG tablet Take 1 tablet by mouth daily 6/17/15   Allie Sours, DO   gabapentin (NEURONTIN) 300 MG capsule Take 1 capsule by mouth 3 times daily. 2/18/15   Darian Mtz, DO   fenofibrate (TRICOR) 145 MG tablet TAKE 1 TABLET BY MOUTH DAILY. 11/20/14   Allie Sours, DO   cloNIDine (CATAPRES) 0.3 MG tablet Take 1 tablet by mouth 2 times daily. 11/20/14   Allie Sours, DO       Allergies:  Darvocet [propoxyphene n-acetaminophen], Sulfa antibiotics, and Ultram [tramadol hcl]    Social History:      The patient currently lives in a motel with her boyfriend as her boyfriend is a . TOBACCO:   reports that she has never smoked. She has never used smokeless tobacco.  ETOH:   reports no history of alcohol use. E-cigarette/Vaping       Questions Responses    E-cigarette/Vaping Use     Start Date     Passive Exposure     Quit Date     Counseling Given     Comments               Family History:       Reviewed and negative in regards to presenting illness/complaint.         Problem Relation Age of Onset    Diabetes Mother     High Blood Pressure Mother     Heart Disease Mother         Arrhythmia    Cancer Father         Lung Cancer    High Blood Pressure Sister     Asthma Maternal Aunt     High Blood Pressure Maternal Aunt     Depression Maternal Grandfather         Suicide       REVIEW OF SYSTEMS COMPLETED:   Pertinent positives as noted in the HPI. All other systems reviewed and negative. PHYSICAL EXAM PERFORMED:    /85   Pulse 73   Temp 98.9 °F (37.2 °C)   Resp 16   SpO2 99%     General appearance:  No apparent distress, appears stated age and cooperative. Anxious. Slightly disheveled. HEENT:  Normal cephalic, atraumatic without obvious deformity. Pupils equal, round, and reactive to light. Extra ocular muscles intact. Conjunctivae/corneas clear. Neck: Supple, with full range of motion. No jugular venous distention. Trachea midline. Respiratory:  Normal respiratory effort. Mild expiratory wheezes noted bilaterally. Cardiovascular: Regular rate and rhythm with normal S1/S2 without murmurs, rubs or gallops. No peripheral edema. Abdomen: Soft, non-tender, non-distended with normal bowel sounds. : No CVA tenderness  Musculoskeletal:  No clubbing or cyanosis. Full range of motion without deformity. Skin: Skin color, texture, turgor normal.  No rashes or lesions. Neurologic:  Neurovascularly intact without any focal sensory/motor deficits. Cranial nerves: II-XII intact, grossly non-focal.  Psychiatric:  Alert and oriented, thought content appropriate, normal insight  Peripheral Pulses: +2 palpable, equal bilaterally       Labs:     Recent Labs     09/28/22 2252   WBC 10.2   HGB 13.1   HCT 38.4        Recent Labs     09/28/22 2252      K 3.7      CO2 24   BUN 21*   CREATININE 1.1   CALCIUM 9.5     Recent Labs     09/28/22 2252   AST 18   ALT 17   BILITOT 0.4   ALKPHOS 60     No results for input(s): INR in the last 72 hours.   Recent Labs     09/28/22 2252   TROPONINI <0.01       Urinalysis:      Lab Results   Component Value Date/Time    NITRU Negative 04/22/2022 12:31 PM WBCUA 21-50 04/22/2022 12:31 PM    BACTERIA 4+ 04/22/2022 12:31 PM    RBCUA 3-4 04/22/2022 12:31 PM    BLOODU SMALL 04/22/2022 12:31 PM    SPECGRAV >=1.030 04/22/2022 12:31 PM    GLUCOSEU Negative 04/22/2022 12:31 PM    GLUCOSEU NEGATIVE 05/15/2012 12:39 AM       Radiology:     CXR: I have reviewed the CXR with the following interpretation: Nonacute  EKG:  I have reviewed the EKG with the following interpretation: NSR    XR CHEST PORTABLE   Final Result   Stable chronic changes no acute abnormality seen. Medications:  Not in a hospital admission.   Current Facility-Administered Medications   Medication Dose Route Frequency Provider Last Rate Last Admin    perflutren lipid microspheres (DEFINITY) injection 1.65 mg  1.5 mL IntraVENous ONCE PRN Rena Vera DO        glucose-vitamin C chewable tablet 4 tablet  4 tablet Oral PRN Baron Vergara,         dextrose bolus 10% 125 mL  125 mL IntraVENous PRN Rena Vera DO        Or    dextrose bolus 10% 250 mL  250 mL IntraVENous PRN Rena Vera DO        glucagon (rDNA) injection 1 mg  1 mg SubCUTAneous PRN Baron Vergara,         dextrose 10 % infusion   IntraVENous Continuous PRN Baron Vergara DO        insulin lispro (HUMALOG) injection vial 0-8 Units  0-8 Units SubCUTAneous TID WC Baron Vergara,         insulin lispro (HUMALOG) injection vial 0-4 Units  0-4 Units SubCUTAneous Nightly Baron Vergara DO        amLODIPine (NORVASC) tablet 5 mg  5 mg Oral Daily Baron Vergara DO        atorvastatin (LIPITOR) tablet 40 mg  40 mg Oral Daily Baron Vergara,         busPIRone (BUSPAR) tablet 10 mg  10 mg Oral TID Rena Vera DO        cloNIDine (CATAPRES) tablet 0.3 mg  0.3 mg Oral BID Rena Vera DO        fenofibrate (TRIGLIDE) tablet 160 mg  160 mg Oral Daily Baron Vergara,         gabapentin (NEURONTIN) capsule 300 mg  300 mg Oral TID Rena Vera DO        lisinopril (PRINIVIL;ZESTRIL) tablet 25 mg  25 mg Oral Daily Rena Vera DO CONSULT TO HOSPITALIST    ASSESSMENT:    Active Hospital Problems    Diagnosis Date Noted    Chest pain [R07.9] 03/11/2018    Hyperlipidemia [E78.5]     GERD (gastroesophageal reflux disease) [K21.9]     Depression [F32. A] 08/23/2012    Ventricular fibrillation ([facility] [address] [address] [I49.01]     Anxiety [F41.9]     Hypertension [I10]          PLAN:  This is a 78 y.o. female who presented to [facility] and is being treated for:    Chest pain  -Likely 2/2 anxiety  -; troponin not elevated  -IVF  -Echo  -Benzo as needed for anxiety  -Daily labs; replace electrolytes as needed  -Hold off on cardiology consult at this time    Hypertension  -Continue home antihypertensives    Hyperlipidemia  -Statin and fenofibrate    Diabetes  -SSI      DVT ppx: Lovenox  GI ppx: PPI  Diet: Diet NPO  Code Status: Full Code    PT/OT Eval Status: ordered    Disposition - home after medical treatment       Elise Ovalle DO  9/29/2022  2:19 AM    Please note that some part of this chart was generated using Dragon dictation software. Although every effort was made to ensure the accuracy of this automated transcription, some errors in transcription may have occurred inadvertently. If you may need any clarification, please do not hesitate to contact me through [facility].

## 2022-09-29 NOTE — PROGRESS NOTES
Pt arrived to 3a from echo/ER at 1420, A&OX4, BP (!) 141/77   Pulse 74   Temp 98.6 °F (37 °C) (Oral)   Resp 16   Wt 170 lb (77.1 kg)   SpO2 94%   BMI 30.11 kg/m²   Home meds taken to pharmacy, pt has no complaints of chest pain at this time, SR on tele, oriented to room and call light.

## 2022-09-29 NOTE — ACP (ADVANCE CARE PLANNING)
Advanced Care Planning Note. Purpose of Encounter: Advanced care planning in light of chest pain admission. Parties In Attendance: Patient,    Decisional Capacity: Yes  Subjective: Chest pain. Anxiety. Objective:   Goals of Care Determination: Patient/POA wishes to seek full treatment. Plan: Echo. IVF. Code Status: Full code   Time spent on Advanced care Plannin minutes  Advanced Care Planning Documents: Completed advanced directives on chart, patient is the POA. Patient's boyfriend, Peter Tobias, would make decisions if patient were incapacitated.     Steve Carrion DO  2022 2:19 AM

## 2022-09-29 NOTE — ED NOTES
Report given to 3A RN. No further questions at this time. Pt to be transported from [place] to  by transport tech.       Katelynn Alcala, CLARENCE  09/29/22 [id number]

## 2022-09-29 NOTE — ED PROVIDER NOTES
[facility]        Pt Name: Brandyn Thrasher  MRN: [id number]  [place] 1955  Date of evaluation: 9/28/2022  Provider: Emily Michelle PA-C  PCP: PROVIDER Magui Hightwoer MD  Note Started: 10:30 PM EDT       WILBER. I have evaluated this patient. My supervising physician was available for consultation. CHIEF COMPLAINT       Chief Complaint   Patient presents with    Chest Pain     Pt from home by EMS, pt had chest pain that started 4-5 hours ago. Pt states that chest pain pain feels tight. Pt states that hx of Afib and CHF. Pt has metronic pacemaker. EMS gave her 324 of aspirin and 2 nitro SL. HISTORY OF PRESENT ILLNESS   (Location, Timing/Onset, Context/Setting, Quality, Duration, Modifying Factors, Severity, Associated Signs and Symptoms)  Note limiting factors. Chief Complaint: Chest pain    Brandyn Thrasher is a 78 y.o. female who presents to the emergency department stating that she has squeezing chest pain starting about 4 hours ago while she was walking. She associates this with shortness of breath, diaphoresis and lightheadedness/dizziness. She felt like she was about to pass out. She does appear very anxious and states that she does have history of terrible anxiety. She is requesting something to calm her nerves. She received full-strength aspirin in route to the emergency department. She received 2 nitroglycerin sublingual prior to arrival.  She states that this did provide some relief for her. She currently rates her pain to be a 6 out of 10 on pain scale. She denies any recent illicit drug use. However does have history of drug abuse. She was diagnosed with covid 19 two weeks ago. Nursing Notes were all reviewed and agreed with or any disagreements were addressed in the HPI.     REVIEW OF SYSTEMS    (2-9 systems for level 4, 10 or more for level 5)     Review of Systems   Constitutional:  Positive for diaphoresis and fatigue. Negative for chills and fever. HENT: Negative. Eyes:  Negative for visual disturbance. Respiratory:  Positive for shortness of breath. Negative for cough, wheezing and stridor. Cardiovascular:  Positive for chest pain. Negative for palpitations and leg swelling. Gastrointestinal:  Negative for abdominal pain, constipation, diarrhea, nausea and vomiting. Genitourinary: Negative. Musculoskeletal:  Negative for back pain, neck pain and neck stiffness. Skin:  Negative for color change, pallor, rash and wound. Neurological:  Positive for dizziness and light-headedness. Negative for tremors, seizures, syncope, facial asymmetry, speech difficulty, weakness, numbness and headaches. Psychiatric/Behavioral:  Negative for confusion. All other systems reviewed and are negative. Positives and Pertinent negatives as per HPI. Except as noted above in the ROS, all other systems were reviewed and negative.        PAST MEDICAL HISTORY     Past Medical History:   Diagnosis Date    Anxiety     Chronic kidney disease, stage 3     Dr. Christen Null    Chronic neck pain     Drug abuse 8-16-14    Methadone on drug screen / History Cocaine    GERD (gastroesophageal reflux disease)     Glucose intolerance (impaired glucose tolerance) 4-12-13    Hiatal hernia     Hyperlipidemia     Hypertension     White Coat    Opiate addiction ([address]     Ventricular fibrillation ([facility] [address] [facility]     S/P implanted defibrillator    White coat hypertension          SURGICAL HISTORY     Past Surgical History:   Procedure Laterality Date    A-V CARDIAC PACEMAKER INSERTION  12/2011    CERVICAL FUSION  2010    CHOLECYSTECTOMY  6/2012    LAPAROTOMY  2010    WISDOM TOOTH EXTRACTION           CURRENTMEDICATIONS       Previous Medications    AMLODIPINE (NORVASC) 5 MG TABLET    Take 1 tablet by mouth daily    ATORVASTATIN (LIPITOR) 20 MG TABLET    Take 20 mg by mouth daily    BUSPIRONE (BUSPAR) 10 MG TABLET    TAKE 1 TABLET BY MOUTH Reviewed   CBC WITH AUTO DIFFERENTIAL - Abnormal; Notable for the following components:       Result Value    Neutrophils Absolute 8.6 (*)     Lymphocytes Absolute 0.9 (*)     All other components within normal limits   COMPREHENSIVE METABOLIC PANEL - Abnormal; Notable for the following components:    Glucose 186 (*)     BUN 21 (*)     GFR Non- 50 (*)     All other components within normal limits   BRAIN NATRIURETIC PEPTIDE - Abnormal; Notable for the following components:    Pro- (*)     All other components within normal limits   LIPASE   TROPONIN   URINE DRUG SCREEN   URINALYSIS WITH REFLEX TO CULTURE       When ordered only abnormal lab results are displayed. All other labs were within normal range or not returned as of this dictation. EKG: When ordered, EKG's are interpreted by the Emergency Department Physician in the absence of a cardiologist.  Please see their note for interpretation of EKG. RADIOLOGY:   Non-plain film images such as CT, Ultrasound and MRI are read by the radiologist. Plain radiographic images are visualized and preliminarily interpreted by the ED Provider with the below findings:        Interpretation per the Radiologist below, if available at the time of this note:    XR CHEST PORTABLE   Final Result   Stable chronic changes no acute abnormality seen. PROCEDURES   Unless otherwise noted below, none     Procedures    CRITICAL CARE TIME   N/a    CONSULTS:  I spoke with Loretakristofer Escamilla, with [employer], at 2300 who interrogated her pacemaker which she clarifies as a dual-chamber ICD. Apparently on September 23, she had 10 episodes of V. tach and 8 episodes of SVT. She has not had any episodes since then and no concerning episodes today.       EMERGENCY DEPARTMENT COURSE and DIFFERENTIAL DIAGNOSIS/MDM:   Vitals:    Vitals:    09/28/22 2227 09/28/22 2229   BP: 138/85    Pulse: 88    Resp: 16    Temp:  98.9 °F (37.2 °C)   TempSrc: Oral    SpO2: 96% Patient was given the following medications:  Medications   diazePAM (VALIUM) tablet 2 mg (2 mg Oral Given 9/28/22 2303)   nitroglycerin (NITRO-BID) 2 % ointment 1 inch (1 inch Topical Given 9/28/22 2303)         Is this patient to be included in the SEP-1 Core Measure due to severe sepsis or septic shock? No   Exclusion criteria - the patient is NOT to be included for SEP-1 Core Measure due to: Infection is not suspected    This patient presents complaining of chest pain. She felt dizzy, diaphoretic and felt like she was going to pass out earlier. She does appear extremely anxious. Does get some relief with nitroglycerin. Valium was given for anxiety. Chest x-ray shows no acute intrathoracic abnormality. She was recently diagnosed with COVID-19 2 weeks ago. Denies any sharp stabbing or pleuritic pain. She is not tachypneic, tachycardic or hypoxic at this time. She has an elevated heart score without any recent cardiac testing. Therefore, I do feel admission is warranted for further evaluation. Her [employer] pacemaker was interrogated. No acute findings today. FINAL IMPRESSION      1. Acute chest pain    2. Anxiety state          DISPOSITION/PLAN   DISPOSITION Decision To Admit 09/28/2022 11:27:36 PM      PATIENT REFERRED TO:  No follow-up provider specified.     DISCHARGE MEDICATIONS:  New Prescriptions    No medications on file       DISCONTINUED MEDICATIONS:  Discontinued Medications    No medications on file              (Please note that portions of this note were completed with a voice recognition program.  Efforts were made to edit the dictations but occasionally words are mis-transcribed.)    Rakel Saxena PA-C (electronically signed)           Rakel Saxena PA-C  09/28/22 9294

## 2022-09-29 NOTE — PROGRESS NOTES
Patient seen in ED, room 26. Admission just initiated when transporter arrived to take patient to echo. All her belongings, including chewing tobacco and clothing gathered and sent with her. Will continue with admission process upon return to the ED. Call placed to pharmacy to validate home medications. Patient states that she currently gets her prescriptions filled at [facility] on [address] and medication list adjusted to reflect these current medication. The pharmacy attached to current med list is [facility] pharmacy on [facility] however, she has not had any prescriptions filled there in the last 90 days. [employer] message sent to physician.

## 2022-09-30 LAB
ALBUMIN SERPL-MCNC: 4.4 G/DL (ref 3.4–5)
ANION GAP SERPL CALCULATED.3IONS-SCNC: 13 MMOL/L (ref 3–16)
ANION GAP SERPL CALCULATED.3IONS-SCNC: 14 MMOL/L (ref 3–16)
BUN BLDV-MCNC: 30 MG/DL (ref 7–20)
BUN BLDV-MCNC: 31 MG/DL (ref 7–20)
CALCIUM SERPL-MCNC: 9.3 MG/DL (ref 8.3–10.6)
CALCIUM SERPL-MCNC: 9.8 MG/DL (ref 8.3–10.6)
CHLORIDE BLD-SCNC: 105 MMOL/L (ref 99–110)
CHLORIDE BLD-SCNC: 105 MMOL/L (ref 99–110)
CO2: 24 MMOL/L (ref 21–32)
CO2: 24 MMOL/L (ref 21–32)
CREAT SERPL-MCNC: 2 MG/DL (ref 0.6–1.2)
CREAT SERPL-MCNC: 2.1 MG/DL (ref 0.6–1.2)
GFR AFRICAN AMERICAN: 28
GFR AFRICAN AMERICAN: 30
GFR NON-AFRICAN AMERICAN: 24
GFR NON-AFRICAN AMERICAN: 25
GLUCOSE BLD-MCNC: 102 MG/DL (ref 70–99)
GLUCOSE BLD-MCNC: 103 MG/DL (ref 70–99)
GLUCOSE BLD-MCNC: 105 MG/DL (ref 70–99)
GLUCOSE BLD-MCNC: 120 MG/DL (ref 70–99)
GLUCOSE BLD-MCNC: 99 MG/DL (ref 70–99)
HCT VFR BLD CALC: 39.5 % (ref 36–48)
HEMOGLOBIN: 13.1 G/DL (ref 12–16)
MCH RBC QN AUTO: 30.4 PG (ref 26–34)
MCHC RBC AUTO-ENTMCNC: 33.3 G/DL (ref 31–36)
MCV RBC AUTO: 91.4 FL (ref 80–100)
PDW BLD-RTO: 13.6 % (ref 12.4–15.4)
PERFORMED ON: ABNORMAL
PHOSPHORUS: 4.5 MG/DL (ref 2.5–4.9)
PLATELET # BLD: 311 K/UL (ref 135–450)
PMV BLD AUTO: 8 FL (ref 5–10.5)
POTASSIUM SERPL-SCNC: 3.7 MMOL/L (ref 3.5–5.1)
POTASSIUM SERPL-SCNC: 3.9 MMOL/L (ref 3.5–5.1)
POTASSIUM SERPL-SCNC: 5.4 MMOL/L (ref 3.5–5.1)
RBC # BLD: 4.32 M/UL (ref 4–5.2)
SODIUM BLD-SCNC: 142 MMOL/L (ref 136–145)
SODIUM BLD-SCNC: 143 MMOL/L (ref 136–145)
URINE CULTURE, ROUTINE: NORMAL
WBC # BLD: 8.1 K/UL (ref 4–11)

## 2022-09-30 PROCEDURE — 6370000000 HC RX 637 (ALT 250 FOR IP): Performed by: STUDENT IN AN ORGANIZED HEALTH CARE EDUCATION/TRAINING PROGRAM

## 2022-09-30 PROCEDURE — 97530 THERAPEUTIC ACTIVITIES: CPT

## 2022-09-30 PROCEDURE — 97165 OT EVAL LOW COMPLEX 30 MIN: CPT

## 2022-09-30 PROCEDURE — 97161 PT EVAL LOW COMPLEX 20 MIN: CPT

## 2022-09-30 PROCEDURE — 36415 COLL VENOUS BLD VENIPUNCTURE: CPT

## 2022-09-30 PROCEDURE — 2580000003 HC RX 258: Performed by: INTERNAL MEDICINE

## 2022-09-30 PROCEDURE — 1200000000 HC SEMI PRIVATE

## 2022-09-30 PROCEDURE — 2580000003 HC RX 258: Performed by: STUDENT IN AN ORGANIZED HEALTH CARE EDUCATION/TRAINING PROGRAM

## 2022-09-30 PROCEDURE — 6360000002 HC RX W HCPCS: Performed by: STUDENT IN AN ORGANIZED HEALTH CARE EDUCATION/TRAINING PROGRAM

## 2022-09-30 PROCEDURE — 80069 RENAL FUNCTION PANEL: CPT

## 2022-09-30 PROCEDURE — 6370000000 HC RX 637 (ALT 250 FOR IP): Performed by: INTERNAL MEDICINE

## 2022-09-30 PROCEDURE — 84132 ASSAY OF SERUM POTASSIUM: CPT

## 2022-09-30 PROCEDURE — 85027 COMPLETE CBC AUTOMATED: CPT

## 2022-09-30 PROCEDURE — 6360000002 HC RX W HCPCS: Performed by: INTERNAL MEDICINE

## 2022-09-30 RX ORDER — TRAMADOL HYDROCHLORIDE 50 MG/1
50 TABLET ORAL EVERY 6 HOURS PRN
Status: DISCONTINUED | OUTPATIENT
Start: 2022-09-30 | End: 2022-10-01 | Stop reason: HOSPADM

## 2022-09-30 RX ORDER — SODIUM CHLORIDE, SODIUM LACTATE, POTASSIUM CHLORIDE, CALCIUM CHLORIDE 600; 310; 30; 20 MG/100ML; MG/100ML; MG/100ML; MG/100ML
INJECTION, SOLUTION INTRAVENOUS CONTINUOUS
Status: DISCONTINUED | OUTPATIENT
Start: 2022-09-30 | End: 2022-10-01 | Stop reason: HOSPADM

## 2022-09-30 RX ADMIN — ATORVASTATIN CALCIUM 40 MG: 40 TABLET, FILM COATED ORAL at 22:15

## 2022-09-30 RX ADMIN — DIAZEPAM 2 MG: 2 TABLET ORAL at 03:25

## 2022-09-30 RX ADMIN — ENOXAPARIN SODIUM 40 MG: 100 INJECTION SUBCUTANEOUS at 08:21

## 2022-09-30 RX ADMIN — GABAPENTIN 300 MG: 300 CAPSULE ORAL at 15:28

## 2022-09-30 RX ADMIN — GABAPENTIN 300 MG: 300 CAPSULE ORAL at 22:15

## 2022-09-30 RX ADMIN — GABAPENTIN 300 MG: 300 CAPSULE ORAL at 08:20

## 2022-09-30 RX ADMIN — TRAMADOL HYDROCHLORIDE 50 MG: 50 TABLET, COATED ORAL at 17:34

## 2022-09-30 RX ADMIN — HYDROCHLOROTHIAZIDE 25 MG: 25 TABLET ORAL at 08:20

## 2022-09-30 RX ADMIN — DIAZEPAM 2 MG: 2 TABLET ORAL at 19:00

## 2022-09-30 RX ADMIN — Medication 1000 MG: at 09:50

## 2022-09-30 RX ADMIN — DIAZEPAM 2 MG: 2 TABLET ORAL at 11:20

## 2022-09-30 RX ADMIN — SODIUM CHLORIDE, POTASSIUM CHLORIDE, SODIUM LACTATE AND CALCIUM CHLORIDE: 600; 310; 30; 20 INJECTION, SOLUTION INTRAVENOUS at 11:19

## 2022-09-30 RX ADMIN — TRAMADOL HYDROCHLORIDE 50 MG: 50 TABLET, COATED ORAL at 11:20

## 2022-09-30 RX ADMIN — TRAMADOL HYDROCHLORIDE 50 MG: 50 TABLET, COATED ORAL at 23:56

## 2022-09-30 RX ADMIN — Medication 10 ML: at 08:20

## 2022-09-30 RX ADMIN — LISINOPRIL 20 MG: 10 TABLET ORAL at 08:20

## 2022-09-30 RX ADMIN — CLONIDINE HYDROCHLORIDE 0.3 MG: 0.1 TABLET ORAL at 22:15

## 2022-09-30 RX ADMIN — FENOFIBRATE 160 MG: 160 TABLET ORAL at 08:20

## 2022-09-30 RX ADMIN — Medication 10 ML: at 22:16

## 2022-09-30 ASSESSMENT — PAIN DESCRIPTION - LOCATION
LOCATION: KNEE;NECK

## 2022-09-30 ASSESSMENT — PAIN DESCRIPTION - PAIN TYPE
TYPE: CHRONIC PAIN;OTHER (COMMENT)
TYPE: CHRONIC PAIN
TYPE: CHRONIC PAIN;OTHER (COMMENT)

## 2022-09-30 ASSESSMENT — PAIN DESCRIPTION - ORIENTATION
ORIENTATION: LEFT;RIGHT
ORIENTATION: RIGHT;LEFT
ORIENTATION: LEFT;RIGHT

## 2022-09-30 ASSESSMENT — PAIN DESCRIPTION - DESCRIPTORS
DESCRIPTORS: DISCOMFORT
DESCRIPTORS: ACHING
DESCRIPTORS: DISCOMFORT
DESCRIPTORS: ACHING
DESCRIPTORS: DISCOMFORT
DESCRIPTORS: DISCOMFORT;ACHING

## 2022-09-30 ASSESSMENT — PAIN SCALES - GENERAL
PAINLEVEL_OUTOF10: 6
PAINLEVEL_OUTOF10: 5
PAINLEVEL_OUTOF10: 6

## 2022-09-30 NOTE — PROGRESS NOTES
[facility] Department   Phone: [phone]    Occupational Therapy    [x] Initial Evaluation            [] Daily Treatment Note         [x] Discharge Summary      Patient: Anais Maciel   : 1955   MRN: [id number]   Date of Service:  2022    Admitting Diagnosis:  Chest pain  Current Admission Summary: Anais Maciel is a 78 y.o. female who presents to the emergency department stating that she has squeezing chest pain starting about 4 hours ago while she was walking. She associates this with shortness of breath, diaphoresis and lightheadedness/dizziness. She felt like she was about to pass out. She does appear very anxious and states that she does have history of terrible anxiety. She is requesting something to calm her nerves. She received full-strength aspirin in route to the emergency department. She received 2 nitroglycerin sublingual prior to arrival.  She states that this did provide some relief for her. She currently rates her pain to be a 6 out of 10 on pain scale. She denies any recent illicit drug use. However does have history of drug abuse. She was diagnosed with covid 19 two weeks ago. Past Medical History:  has a past medical history of Anxiety, Chronic kidney disease, stage 3 ([address] Chronic neck pain, Drug abuse ([address] GERD (gastroesophageal reflux disease), Glucose intolerance (impaired glucose tolerance), Hiatal hernia, Hyperlipidemia, Hypertension, Opiate addiction ([address] Ventricular fibrillation ([address] and White coat hypertension. Past Surgical History:  has a past surgical history that includes A-V cardiac pacemaker insertion (2011); cervical fusion (); Cholecystectomy (2012); [place] tooth extraction; and laparotomy (). Discharge Recommendations: Anais Maciel scored a  /24 on the AM-PAC ADL Inpatient form.  Current research shows that an AM-PAC score of 18 or greater is typically associated with a discharge to the patient's home setting. Based on the patient's AM-PAC score, and their current ADL deficits, it is recommended that the patient have 2-3 sessions per week of Occupational Therapy at d/c to increase the patient's independence. At this time, this patient demonstrates the endurance and safety to discharge home with no [facility] services and a follow up treatment frequency of 2-3x/wk. Please see assessment section for further patient specific details. If patient discharges prior to next session this note will serve as a discharge summary. Please see below for the latest assessment towards goals. DME Required For Discharge: no DME required at discharge    Precautions/Restrictions: low fall risk  Weight Bearing Restrictions: no restrictions  [] Right Upper Extremity  [] Left Upper Extremity [] Right Lower Extremity  [] Left Lower Extremity     Required Braces/Orthotics: no braces required   [] Right  [] Left  Positional Restrictions:no positional restrictions    Pre-Admission Information   Lives With: significant other    Type of Home: [facility]  Home Layout: one level  Home Access: level entry  [facility]: tub/shower unit  Bathroom Equipment:  no DME  Toilet Height: standard height  Home Equipment: no prior equipment  Transfer Assistance: Independent without use of device  Ambulation Assistance:Independent without use of device  ADL Assistance: independent with all ADL's  IADL Assistance: independent with homemaking tasks  Active :        [x] Yes  [] No  Hand Dominance: [] Left  [x] Right  Current Employment: unemployed  Hobbies: Likes to go to [employer] w/c for long distance ambulation. Recent Falls: Pt reports no falls in last 6 months. Examination   Vision:   Vision Gross Assessment: WFL  Hearing:   WFL  Perception:   WFL  Observation:   General Observation:  Pt presents with pleasant demeanor and agreeable for session.    Posture:   Good  Sensation:   WFL  Proprioception:    WFL  Tone: Normotonic  Coordination Testing:   WFL    ROM:   (B) UE AROM WFL  Strength:   (B) UE strength grossly WFL    Decision Making: low complexity  Clinical Presentation: stable      Subjective  General: Pt supine in bed on arrival and agreeable for session. Pain: 6/10. Location: neck and B knees  Pain Interventions: RN notified        Activities of Daily Living  Basic Activities of Daily Living  Feeding: Independent  Instrumental Activities of Daily Living  No IADL completed on this date. Functional Mobility  Bed Mobility  Bed mobility not completed on this date. Comments:  Transfers  Sit to stand transfer:modified independent  Stand to sit transfer: modified independent  Stand step transfer: modified independent  Comments:no AD  Functional Mobility:  Sitting Balance: modified independent. Sitting Balance Comment: Sitting EOB  Standing Balance: modified independent. Standing Balance Comment: Static and dynamic   Functional Mobility: .  modified independent  Functional Mobility Activity: to/from bathroom  Functional Mobility Device Use: no device    Other Therapeutic Interventions    Functional Outcomes       Cognition  WFL  Orientation:    alert and oriented x 4  Command Following:   [facility]     Education  Barriers To Learning: none  Patient Education: patient educated on OT role and benefits, plan of care, precautions, transfer training, discharge recommendations  Learning Assessment:  patient verbalizes and demonstrates understanding    Assessment  Activity Tolerance: Good  Impairments Requiring Therapeutic Intervention: none - eval with same day discharge  Prognosis: good  Clinical Assessment: Pt presents at baseline functional status as evidenced by Modified [place] with bed mobility, ADL transfers, functional mobility, activity tolerance and cognition. DC from acute care OT caseload and recommend DC to home.    Safety Interventions: patient left in bed, call light within reach, and nurse notified    Plan  Frequency: Eval with same day discharge. No follow up required. Current Treatment Recommendations: not applicable, evaluation completed with same day discharge. Goals  Patient Goals: Safe Discharge   Short Term Goals:  Time Frame: N/A  Patient eval with same day discharge. No goals set as patient is at baseline status. Therapy Session Time     Individual Group Co-treatment   Time In     1127   Time Out    1150   Minutes     23 minutes        Timed Code Treatment Minutes:   8 minutes  Total Treatment Minutes:  23 minutes       Electronically Signed By: Suzanne Smith, [facility] Sissy Martinez, OTR/L [id number].

## 2022-09-30 NOTE — PLAN OF CARE
Problem: Discharge Planning  Goal: Discharge to home or other facility with appropriate resources  Outcome: Progressing     Problem: Pain  Goal: Verbalizes/displays adequate comfort level or baseline comfort level  Outcome: Progressing     Problem: Respiratory - Adult  Goal: Achieves optimal ventilation and oxygenation  Outcome: Progressing     Problem: Cardiovascular - Adult  Goal: Maintains optimal cardiac output and hemodynamic stability  Outcome: Progressing     Problem: Metabolic/Fluid and Electrolytes - Adult  Goal: Electrolytes maintained within normal limits  Outcome: Progressing     Problem: Hematologic - Adult  Goal: Maintains hematologic stability  Outcome: Progressing

## 2022-09-30 NOTE — PROGRESS NOTES
Hospitalist Progress Note      PCP: PROVIDER Jennifer Allison MD    Date of Admission: 9/28/2022    Chief Complaint: Chest pain      Subjective:   Chest pain is now resolved. Does have generalized weakness and fatigue. BMP in am with elevated Cr and hyperkalemia    Medications:  Reviewed    Infusion Medications    lactated ringers 100 mL/hr at 09/30/22 1119    dextrose      sodium chloride       Scheduled Medications    cefTRIAXone (ROCEPHIN) IV  1,000 mg IntraVENous Q24H    sodium zirconium cyclosilicate  5 g Oral Once    insulin lispro  0-8 Units SubCUTAneous TID WC    insulin lispro  0-4 Units SubCUTAneous Nightly    atorvastatin  40 mg Oral Nightly    cloNIDine  0.3 mg Oral BID    fenofibrate  160 mg Oral Daily    gabapentin  300 mg Oral TID    sodium chloride flush  5-40 mL IntraVENous 2 times per day    enoxaparin  40 mg SubCUTAneous Daily    pantoprazole  40 mg Oral QAM AC    lisinopril  20 mg Oral Daily    And    hydroCHLOROthiazide  25 mg Oral Daily     PRN Meds: regadenoson, traMADol, perflutren lipid microspheres, glucose, dextrose bolus **OR** dextrose bolus, glucagon (rDNA), dextrose, sodium chloride flush, sodium chloride, ondansetron **OR** ondansetron, polyethylene glycol, acetaminophen **OR** acetaminophen, potassium chloride, magnesium sulfate, diazePAM    No intake or output data in the 24 hours ending 09/30/22 1321    Exam:    /74   Pulse 84   Temp 98 °F (36.7 °C) (Oral)   Resp 16   Ht 5' 5\" (1.651 m)   Wt 181 lb 4.8 oz (82.2 kg)   SpO2 95%   BMI 30.17 kg/m²     Gen/overall appearance: Not in acute distress. Alert. Head: Normocephalic, atraumatic  Eyes: EOMI, no scleral icterus  CVS: regular rate and rhythm, Normal S1S2  Pulm: Clear to auscultation bilaterally. No crackles/wheezes  Gastrointestinal: Soft, nontender, no guarding or rebound  Extremities: No edema.  No erythema or warmth  Neuro: No gross focal deficits noted  Skin: Warm, dry    Labs:   Recent Labs     09/28/22  7221 09/30/22  0826   WBC 10.2 8.1   HGB 13.1 13.1   HCT 38.4 39.5    311     Recent Labs     09/28/22  2252 09/30/22  0826 09/30/22  1054    143  --    K 3.7 5.4* 3.7    105  --    CO2 24 24  --    BUN 21* 30*  --    CREATININE 1.1 2.1*  --    CALCIUM 9.5 9.8  --    PHOS  --  4.5  --      Recent Labs     09/28/22  2252   AST 18   ALT 17   BILITOT 0.4   ALKPHOS 60     No results for input(s): INR in the last 72 hours. Recent Labs     09/28/22  2252 09/29/22  1235 09/29/22  1715   TROPONINI <0.01 <0.01 <0.01       Assessment/Plan:    Active Hospital Problems    Diagnosis Date Noted    Chest pain [R07.9] 03/11/2018    Hyperlipidemia [E78.5]     GERD (gastroesophageal reflux disease) [K21.9]     Depression [F32. A] 08/23/2012    Ventricular fibrillation (HCC) [I49.01]     Anxiety [F41.9]     Hypertension [I10]        Atypical chest pain. Suspect 2/2 anxiety. Rule out CSA  -serial trops negative  -ECHO non-acute  -Stress testing    MELO. Suspect 2/2 prerenal azotemia vs lab error  Hyperkalemia  -repeat BMP; if accurate  -gentle IVF hydration  -trend Cr  -monitor and replace lytes  -ins/outs  -avoid nephrotoxins    Suspected UTI  -IV rocephin for now  -follow up cultures    PMH of DM, htn, hld    DVT Prophylaxis: lovenox  Diet: ADULT DIET;  Regular  Diet NPO  Code Status: Full Code    Diana Reyes MD

## 2022-09-30 NOTE — PROGRESS NOTES
UYEN 5.4 from todays lab. Spoke with Dr. Merline Mouse regarding potassium elevated beyond protocol for stress testing. Dr. Merline Mouse reviewed chart and requested a STAT Potasium level done now. If still elevated, pt needs cardiology consult prior to stress testing. RN called and notified.  Will order Potassium level and notify

## 2022-09-30 NOTE — PLAN OF CARE
Problem: Discharge Planning  Goal: Discharge to home or other facility with appropriate resources  9/30/2022 1942 by Jose Garcia RN  Outcome: Completed  9/30/2022 1942 by Jose Garcia RN  Outcome: Progressing     Problem: Pain  Goal: Verbalizes/displays adequate comfort level or baseline comfort level  9/30/2022 1942 by Jose Garcia RN  Outcome: Completed  9/30/2022 1942 by Jose Garcia RN  Outcome: Progressing     Problem: Respiratory - Adult  Goal: Achieves optimal ventilation and oxygenation  9/30/2022 1942 by Jose Garcia RN  Outcome: Completed  9/30/2022 1942 by Jose Garcia RN  Outcome: Progressing     Problem: Cardiovascular - Adult  Goal: Maintains optimal cardiac output and hemodynamic stability  9/30/2022 1942 by Jose Garcia RN  Outcome: Completed  9/30/2022 1942 by Jose Garcia RN  Outcome: Progressing     Problem: Metabolic/Fluid and Electrolytes - Adult  Goal: Electrolytes maintained within normal limits  9/30/2022 1942 by Jose Garcia RN  Outcome: Completed  9/30/2022 1942 by Jose Garcia RN  Outcome: Progressing     Problem: Hematologic - Adult  Goal: Maintains hematologic stability  9/30/2022 1942 by Jose Garcia RN  Outcome: Completed  9/30/2022 1942 by Jose Garcia RN  Outcome: Progressing

## 2022-09-30 NOTE — PROGRESS NOTES
[facility] Department   Phone: [phone]    Physical Therapy    [x] Initial Evaluation            [] Daily Treatment Note         [x] Discharge Summary      Patient: Augie Vega   : 1955   MRN: [id number]   Date of Service:  2022  Admitting Diagnosis: Chest pain    Current Admission Summary: Augie Vega is a 78 y.o. female who presents to the emergency department stating that she has squeezing chest pain starting about 4 hours ago while she was walking. She associates this with shortness of breath, diaphoresis and lightheadedness/dizziness. She felt like she was about to pass out. She does appear very anxious and states that she does have history of terrible anxiety. She is requesting something to calm her nerves. She received full-strength aspirin in route to the emergency department. She received 2 nitroglycerin sublingual prior to arrival.  She states that this did provide some relief for her. She currently rates her pain to be a 6 out of 10 on pain scale. She denies any recent illicit drug use. However does have history of drug abuse. She was diagnosed with covid 19 two weeks ago. Past Medical History:  has a past medical history of Anxiety, Chronic kidney disease, stage 3 ([address] Chronic neck pain, Drug abuse ([address] GERD (gastroesophageal reflux disease), Glucose intolerance (impaired glucose tolerance), Hiatal hernia, Hyperlipidemia, Hypertension, Opiate addiction ([address] Ventricular fibrillation ([address] and White coat hypertension. Past Surgical History:  has a past surgical history that includes A-V cardiac pacemaker insertion (2011); cervical fusion (); Cholecystectomy (2012); [place] tooth extraction; and laparotomy (). Discharge Recommendations: Augie Vega scored a 24/24 on the AM-PAC short mobility form. At this time, no further PT is recommended upon discharge due to patient at independent level.   Recommend patient returns to prior setting with prior services. DME Required For Discharge: No new DME required  Precautions/Restrictions: low fall risk  Positional Restrictions:no positional restrictions    Pre-Admission Information   Lives With: significant other                Type of Home: hotel  Home Layout: one level  Home Access: level entry   [address]: tub/shower unit  [address]:  no DME  Toilet Height: standard height  Home Equipment: no prior equipment  Transfer Assistance: Independent without use of device  Ambulation Assistance:Independent without use of device  ADL Assistance: independent with all ADL's  IADL Assistance: independent with homemaking tasks  Active :        [x] Yes                 [] No  Hand Dominance: [] Left                 [x] Right  Current Employment: unemployed  Hobbies: Likes to go to [email or web address] for long distance ambulation. Recent Falls: Pt reports no falls in last 6 months. Examination   Vision:   Vision Gross Assessment: WFL  Hearing:   WFL  Observation:   General Observation:  Patient supine in bed. Posture:   WFL  Sensation:   denies numbness and tingling  ROM:   (B) LE ROM WFL  Strength:   (B) LE gross strength WFL  Decision Making: low complexity  Clinical Presentation: stable      Subjective  General: Patient reports she has been up ambulating independently, feels back to normal.  Pain: 6/10. Location: neck, knees  Pain Interventions: RN notified       Functional Mobility  Bed Mobility  Supine to Sit: modified independent  Sit to Supine: modified independent  Comments:  Transfers  Sit to stand transfer: modified independent  Stand to sit transfer: modified independent  Bed to chair transfer: modified independent  Comments:  Ambulation  Surface:level surface  Assistive Device: no device  Assistance: modified independent  Distance: 20'  Gait Mechanics: No apparent deficits.   Comments:    Stair Mobility  Stair mobility not completed on this date.  Comments:  Balance  Static Sitting Balance: good: independent with functional balance in unsupported position  Dynamic Sitting Balance: good: independent with functional balance in unsupported position  Static Standing Balance: good: independent with functional balance in unsupported position  Dynamic Standing Balance: good: independent with functional balance in unsupported position  Comments:    Other Therapeutic Interventions    Functional Outcomes  -PAC Inpatient Mobility Raw Score : 24              Cognition  WFL  Orientation:    A&O x 4    Education  Barriers To Learning: none  Patient Education: patient educated on goals, PT role and benefits, plan of care, discharge recommendations  Learning Assessment:  Pt verbalized and demonstrates understanding    Assessment  Impairments Requiring Therapeutic Intervention: none - eval with same day discharge  Prognosis: good without need for therapy intervention  Clinical Assessment: Patient presenting at independent level for completion of required mobility tasks for return to home. Eval with d/c at this time. No therapy services indicated. Safety Interventions: patient left in bed, call light within reach, and nurse notified    Plan  Frequency: Eval with same day discharge. No follow up required. Current Treatment Recommendations: Not applicable, evaluation completed with same day discharge. Goals  Patient eval with same day discharge. No goals set as patient is at baseline mobility status.       Therapy Session Time      Individual Group Co-treatment   Time In     0835   Time Out     1150   Minutes     23     Timed Code Treatment Minutes:  8 Minutes  Total Treatment Minutes:  23        Electronically Signed By: Lisa Fitzpatrick PT, DPT, [id number]

## 2022-10-01 VITALS
HEART RATE: 80 BPM | OXYGEN SATURATION: 95 % | HEIGHT: 65 IN | TEMPERATURE: 97.8 F | BODY MASS INDEX: 31.21 KG/M2 | RESPIRATION RATE: 16 BRPM | WEIGHT: 187.3 LBS | DIASTOLIC BLOOD PRESSURE: 79 MMHG | SYSTOLIC BLOOD PRESSURE: 151 MMHG

## 2022-10-01 LAB
ALBUMIN SERPL-MCNC: 3.6 G/DL (ref 3.4–5)
ANION GAP SERPL CALCULATED.3IONS-SCNC: 11 MMOL/L (ref 3–16)
BUN BLDV-MCNC: 35 MG/DL (ref 7–20)
CALCIUM SERPL-MCNC: 9 MG/DL (ref 8.3–10.6)
CHLORIDE BLD-SCNC: 105 MMOL/L (ref 99–110)
CO2: 25 MMOL/L (ref 21–32)
CREAT SERPL-MCNC: 1.1 MG/DL (ref 0.6–1.2)
GFR AFRICAN AMERICAN: >60
GFR NON-AFRICAN AMERICAN: 50
GLUCOSE BLD-MCNC: 104 MG/DL (ref 70–99)
GLUCOSE BLD-MCNC: 119 MG/DL (ref 70–99)
GLUCOSE BLD-MCNC: 142 MG/DL (ref 70–99)
HCT VFR BLD CALC: 33.4 % (ref 36–48)
HEMOGLOBIN: 11.4 G/DL (ref 12–16)
LV EF: 62 %
LVEF MODALITY: NORMAL
MCH RBC QN AUTO: 30.9 PG (ref 26–34)
MCHC RBC AUTO-ENTMCNC: 34.1 G/DL (ref 31–36)
MCV RBC AUTO: 90.8 FL (ref 80–100)
PDW BLD-RTO: 13.5 % (ref 12.4–15.4)
PERFORMED ON: ABNORMAL
PERFORMED ON: ABNORMAL
PHOSPHORUS: 3.7 MG/DL (ref 2.5–4.9)
PLATELET # BLD: 280 K/UL (ref 135–450)
PMV BLD AUTO: 8.3 FL (ref 5–10.5)
POTASSIUM SERPL-SCNC: 4.1 MMOL/L (ref 3.5–5.1)
RBC # BLD: 3.67 M/UL (ref 4–5.2)
SODIUM BLD-SCNC: 141 MMOL/L (ref 136–145)
WBC # BLD: 5.8 K/UL (ref 4–11)

## 2022-10-01 PROCEDURE — 6360000002 HC RX W HCPCS: Performed by: INTERNAL MEDICINE

## 2022-10-01 PROCEDURE — 78452 HT MUSCLE IMAGE SPECT MULT: CPT | Performed by: INTERNAL MEDICINE

## 2022-10-01 PROCEDURE — 85027 COMPLETE CBC AUTOMATED: CPT

## 2022-10-01 PROCEDURE — 6370000000 HC RX 637 (ALT 250 FOR IP): Performed by: STUDENT IN AN ORGANIZED HEALTH CARE EDUCATION/TRAINING PROGRAM

## 2022-10-01 PROCEDURE — 2580000003 HC RX 258: Performed by: STUDENT IN AN ORGANIZED HEALTH CARE EDUCATION/TRAINING PROGRAM

## 2022-10-01 PROCEDURE — A9502 TC99M TETROFOSMIN: HCPCS | Performed by: INTERNAL MEDICINE

## 2022-10-01 PROCEDURE — 80069 RENAL FUNCTION PANEL: CPT

## 2022-10-01 PROCEDURE — 2580000003 HC RX 258: Performed by: INTERNAL MEDICINE

## 2022-10-01 PROCEDURE — 36415 COLL VENOUS BLD VENIPUNCTURE: CPT

## 2022-10-01 PROCEDURE — 3430000000 HC RX DIAGNOSTIC RADIOPHARMACEUTICAL: Performed by: INTERNAL MEDICINE

## 2022-10-01 PROCEDURE — 6370000000 HC RX 637 (ALT 250 FOR IP): Performed by: INTERNAL MEDICINE

## 2022-10-01 PROCEDURE — 93017 CV STRESS TEST TRACING ONLY: CPT | Performed by: INTERNAL MEDICINE

## 2022-10-01 RX ORDER — AMINOPHYLLINE DIHYDRATE 25 MG/ML
100 INJECTION, SOLUTION INTRAVENOUS ONCE
Status: DISCONTINUED | OUTPATIENT
Start: 2022-10-01 | End: 2022-10-01 | Stop reason: HOSPADM

## 2022-10-01 RX ADMIN — REGADENOSON 0.4 MG: 0.08 INJECTION, SOLUTION INTRAVENOUS at 08:39

## 2022-10-01 RX ADMIN — CLONIDINE HYDROCHLORIDE 0.3 MG: 0.1 TABLET ORAL at 10:15

## 2022-10-01 RX ADMIN — GABAPENTIN 300 MG: 300 CAPSULE ORAL at 10:16

## 2022-10-01 RX ADMIN — SODIUM CHLORIDE, POTASSIUM CHLORIDE, SODIUM LACTATE AND CALCIUM CHLORIDE: 600; 310; 30; 20 INJECTION, SOLUTION INTRAVENOUS at 13:04

## 2022-10-01 RX ADMIN — GABAPENTIN 300 MG: 300 CAPSULE ORAL at 13:03

## 2022-10-01 RX ADMIN — DIAZEPAM 2 MG: 2 TABLET ORAL at 05:02

## 2022-10-01 RX ADMIN — SODIUM CHLORIDE, POTASSIUM CHLORIDE, SODIUM LACTATE AND CALCIUM CHLORIDE: 600; 310; 30; 20 INJECTION, SOLUTION INTRAVENOUS at 01:57

## 2022-10-01 RX ADMIN — TRAMADOL HYDROCHLORIDE 50 MG: 50 TABLET, COATED ORAL at 10:15

## 2022-10-01 RX ADMIN — Medication 1000 MG: at 10:14

## 2022-10-01 RX ADMIN — DIAZEPAM 2 MG: 2 TABLET ORAL at 13:03

## 2022-10-01 RX ADMIN — TETROFOSMIN 10 MILLICURIE: 1.38 INJECTION, POWDER, LYOPHILIZED, FOR SOLUTION INTRAVENOUS at 07:31

## 2022-10-01 RX ADMIN — TETROFOSMIN 30 MILLICURIE: 1.38 INJECTION, POWDER, LYOPHILIZED, FOR SOLUTION INTRAVENOUS at 08:43

## 2022-10-01 RX ADMIN — FENOFIBRATE 160 MG: 160 TABLET ORAL at 10:16

## 2022-10-01 RX ADMIN — HYDROCHLOROTHIAZIDE 25 MG: 25 TABLET ORAL at 10:15

## 2022-10-01 RX ADMIN — PANTOPRAZOLE SODIUM 40 MG: 40 TABLET, DELAYED RELEASE ORAL at 05:02

## 2022-10-01 RX ADMIN — LISINOPRIL 20 MG: 10 TABLET ORAL at 10:15

## 2022-10-01 RX ADMIN — Medication 10 ML: at 10:15

## 2022-10-01 ASSESSMENT — PAIN DESCRIPTION - ORIENTATION
ORIENTATION: RIGHT;LEFT
ORIENTATION: RIGHT;LEFT

## 2022-10-01 ASSESSMENT — PAIN SCALES - GENERAL
PAINLEVEL_OUTOF10: 6
PAINLEVEL_OUTOF10: 6
PAINLEVEL_OUTOF10: 5
PAINLEVEL_OUTOF10: 5

## 2022-10-01 ASSESSMENT — PAIN DESCRIPTION - LOCATION
LOCATION: KNEE
LOCATION: KNEE

## 2022-10-01 NOTE — PROGRESS NOTES
Aminophylline injection in mar. This RN attempted to call stress lab to see if the medication was given, RN unable to reach stress lab. Med left overdue.

## 2022-10-01 NOTE — PLAN OF CARE
Problem: Hematologic - Adult  Goal: Maintains hematologic stability  Resolved   Pt has been discharged.

## 2022-10-01 NOTE — PROGRESS NOTES
Pt educated on Lexiscan protocol including risks and complications.  All questions answered at this time

## 2022-10-01 NOTE — PROGRESS NOTES
Data- discharge order received, pt verbalized agreement to discharge, disposition to previous residence, no needs for HHC/DME. Action- discharge instructions prepared and given to pt, pt verbalized understanding. Medication information packet given r/t NEW and/or CHANGED prescriptions emphasizing name/purpose/side effects, pt verbalized understanding. Discharge instruction summary: Diet- regular, Activity- up as tolerated, Primary Care Physician as follows: PCP f/u appointment in 1 week, immunizations reviewed and updated, prescription medications filled at pharmacy. CHF education provided. 1. WEIGHT: Admit Weight: 170 lb (77.1 kg) (09/29/22 0356)        Today  Weight: 187 lb 4.8 oz (85 kg) (10/01/22 0500)       2. O2 SAT.: SpO2: 95 % (10/01/22 1215)    Response- Pt belongings gathered, IV removed. Disposition is home (no HHC/DME needs). Pt ask to walk to the front lobby with boyfriend, no wheelchair needed. Pt's home medications picked up from pharmacy.

## 2022-10-01 NOTE — DISCHARGE SUMMARY
Hospital Medicine Discharge Summary    Patient ID: Ramandeep Puga      Patient's PCP: Ariadna Gallegos MD    Admit Date: 9/28/2022     Discharge Date: 10/1/2022    Admitting Physician: Viji Rascon DO     Discharge Physician: Gabino Mcpherson MD     Discharge Diagnoses and Hospital Course: Active Hospital Problems    Diagnosis Date Noted    Chest pain [R07.9] 03/11/2018    Hyperlipidemia [E78.5]     GERD (gastroesophageal reflux disease) [K21.9]     Depression [F32. A] 08/23/2012    Ventricular fibrillation (HCC) [I49.01]     Anxiety [F41.9]     Hypertension [I10]        Atypical chest pain. Suspect 2/2 anxiety. Now resolved. -serial trops negative  -ECHO non-acute  -Stress testing negative  -follow up with PCP for anxiety     Mild MELO. Suspect 2/2 prerenal azotemia vs lab error. Transient and now resolved  Hyperkalemia. Suspect 2/2 hemolysis  -s/p gentle IVF hydration  -trend Cr  -monitor and replace lytes  -ins/outs  -avoid nephrotoxins     Asymptomatic bacteruria  -IV rocephin empirically; d/evaristo after unremarkable culture      Exam:     The patient was seen and examined on day of discharge and this discharge summary is in conjunction with any daily progress note from day of discharge. Consults:     IP CONSULT TO HOSPITALIST    Disposition:  home     Condition:  Stable    Discharge Instructions/Follow-up:   Pt to follow up with PCP within 1 week  Consultants as scheduled    Code Status:  Full Code    Activity: activity as tolerated    Diet: Resume home diet    Labs:  For convenience and continuity at follow-up the following most recent labs are provided:      CBC:    Lab Results   Component Value Date/Time    WBC 5.8 10/01/2022 07:02 AM    HGB 11.4 10/01/2022 07:02 AM    HCT 33.4 10/01/2022 07:02 AM     10/01/2022 07:02 AM       Renal:    Lab Results   Component Value Date/Time     10/01/2022 07:02 AM    K 4.1 10/01/2022 07:02 AM    K 4.1 03/12/2018 05:21 AM     10/01/2022 07:02 AM    CO2 25 10/01/2022 07:02 AM    BUN 35 10/01/2022 07:02 AM    CREATININE 1.1 10/01/2022 07:02 AM    CALCIUM 9.0 10/01/2022 07:02 AM    PHOS 3.7 10/01/2022 07:02 AM       Discharge Medications:     Current Discharge Medication List             Details   gabapentin (NEURONTIN) 600 MG tablet Take 600 mg by mouth 2 times daily. Per [address], filled 9/8/22      atorvastatin (LIPITOR) 40 MG tablet Take 40 mg by mouth daily Per [address], filled 9/9/22      lisinopril-hydroCHLOROthiazide (PRINZIDE;ZESTORETIC) 20-25 MG per tablet Take 1 tablet by mouth daily Per [address]      cloNIDine (CATAPRES) 0.3 MG tablet Take 1 tablet by mouth 2 times daily. Qty: 60 tablet, Refills: 5             Time Spent on discharge is more than 45 minutes in the examination, evaluation, counseling and review of medications and discharge plan. Signed:    Catherine Lujan MD   10/1/2022    Thank you PROVIDER UNKNOWN, MD for the opportunity to be involved in this patient's care.

## 2022-10-01 NOTE — PLAN OF CARE
Problem: Hematologic - Adult  Goal: Maintains hematologic stability  10/1/2022 1626 by Corina Naranjo RN  Outcome: Completed  10/1/2022 1618 by Corina Naranjo RN  Reactivated

## 2022-11-25 ENCOUNTER — APPOINTMENT (OUTPATIENT)
Dept: GENERAL RADIOLOGY | Age: 67
End: 2022-11-25
Payer: MEDICARE

## 2022-11-25 ENCOUNTER — HOSPITAL ENCOUNTER (EMERGENCY)
Age: 67
Discharge: HOME | End: 2022-11-25
Attending: EMERGENCY MEDICINE
Payer: MEDICARE

## 2022-11-25 VITALS
HEIGHT: 65 IN | RESPIRATION RATE: 20 BRPM | BODY MASS INDEX: 26.66 KG/M2 | TEMPERATURE: 98.2 F | SYSTOLIC BLOOD PRESSURE: 161 MMHG | HEART RATE: 108 BPM | OXYGEN SATURATION: 96 % | DIASTOLIC BLOOD PRESSURE: 91 MMHG | WEIGHT: 160 LBS

## 2022-11-25 DIAGNOSIS — Z53.21 ELOPED FROM EMERGENCY DEPARTMENT: Primary | ICD-10-CM

## 2022-11-25 DIAGNOSIS — F41.1 ANXIETY STATE: ICD-10-CM

## 2022-11-25 LAB
A/G RATIO: 1.2 (ref 1.1–2.2)
ALBUMIN SERPL-MCNC: 4.4 G/DL (ref 3.4–5)
ALP BLD-CCNC: 65 U/L (ref 40–129)
ALT SERPL-CCNC: 21 U/L (ref 10–40)
ANION GAP SERPL CALCULATED.3IONS-SCNC: 18 MMOL/L (ref 3–16)
AST SERPL-CCNC: 17 U/L (ref 15–37)
BASOPHILS ABSOLUTE: 0.1 K/UL (ref 0–0.2)
BASOPHILS RELATIVE PERCENT: 0.7 %
BILIRUB SERPL-MCNC: 0.7 MG/DL (ref 0–1)
BUN BLDV-MCNC: 19 MG/DL (ref 7–20)
CALCIUM SERPL-MCNC: 10.4 MG/DL (ref 8.3–10.6)
CHLORIDE BLD-SCNC: 101 MMOL/L (ref 99–110)
CO2: 20 MMOL/L (ref 21–32)
CREAT SERPL-MCNC: 1.1 MG/DL (ref 0.6–1.2)
EOSINOPHILS ABSOLUTE: 0 K/UL (ref 0–0.6)
EOSINOPHILS RELATIVE PERCENT: 0.1 %
GFR SERPL CREATININE-BSD FRML MDRD: 55 ML/MIN/{1.73_M2}
GLUCOSE BLD-MCNC: 163 MG/DL (ref 70–99)
HCT VFR BLD CALC: 42.9 % (ref 36–48)
HEMOGLOBIN: 14.6 G/DL (ref 12–16)
LYMPHOCYTES ABSOLUTE: 1.9 K/UL (ref 1–5.1)
LYMPHOCYTES RELATIVE PERCENT: 19.3 %
MCH RBC QN AUTO: 30 PG (ref 26–34)
MCHC RBC AUTO-ENTMCNC: 34 G/DL (ref 31–36)
MCV RBC AUTO: 88.1 FL (ref 80–100)
MONOCYTES ABSOLUTE: 1 K/UL (ref 0–1.3)
MONOCYTES RELATIVE PERCENT: 10.3 %
NEUTROPHILS ABSOLUTE: 6.7 K/UL (ref 1.7–7.7)
NEUTROPHILS RELATIVE PERCENT: 69.6 %
PDW BLD-RTO: 13.3 % (ref 12.4–15.4)
PLATELET # BLD: 624 K/UL (ref 135–450)
PMV BLD AUTO: 8.4 FL (ref 5–10.5)
POTASSIUM REFLEX MAGNESIUM: 3.7 MMOL/L (ref 3.5–5.1)
PRO-BNP: 384 PG/ML (ref 0–124)
RBC # BLD: 4.87 M/UL (ref 4–5.2)
SODIUM BLD-SCNC: 139 MMOL/L (ref 136–145)
TOTAL PROTEIN: 8.1 G/DL (ref 6.4–8.2)
TROPONIN: <0.01 NG/ML
WBC # BLD: 9.6 K/UL (ref 4–11)

## 2022-11-25 PROCEDURE — 84484 ASSAY OF TROPONIN QUANT: CPT

## 2022-11-25 PROCEDURE — 80053 COMPREHEN METABOLIC PANEL: CPT

## 2022-11-25 PROCEDURE — 71045 X-RAY EXAM CHEST 1 VIEW: CPT

## 2022-11-25 PROCEDURE — 93005 ELECTROCARDIOGRAM TRACING: CPT | Performed by: EMERGENCY MEDICINE

## 2022-11-25 PROCEDURE — 85025 COMPLETE CBC W/AUTO DIFF WBC: CPT

## 2022-11-25 PROCEDURE — 6370000000 HC RX 637 (ALT 250 FOR IP): Performed by: EMERGENCY MEDICINE

## 2022-11-25 PROCEDURE — 83880 ASSAY OF NATRIURETIC PEPTIDE: CPT

## 2022-11-25 PROCEDURE — 36415 COLL VENOUS BLD VENIPUNCTURE: CPT

## 2022-11-25 PROCEDURE — 99285 EMERGENCY DEPT VISIT HI MDM: CPT

## 2022-11-25 RX ORDER — ASPIRIN 81 MG/1
324 TABLET, CHEWABLE ORAL ONCE
Status: COMPLETED | OUTPATIENT
Start: 2022-11-25 | End: 2022-11-25

## 2022-11-25 RX ORDER — HYDROXYZINE PAMOATE 25 MG/1
50 CAPSULE ORAL ONCE
Status: COMPLETED | OUTPATIENT
Start: 2022-11-25 | End: 2022-11-25

## 2022-11-25 RX ADMIN — HYDROXYZINE PAMOATE 50 MG: 25 CAPSULE ORAL at 09:46

## 2022-11-25 RX ADMIN — ASPIRIN 324 MG: 81 TABLET, CHEWABLE ORAL at 09:44

## 2022-11-25 ASSESSMENT — HEART SCORE: ECG: 0

## 2022-11-25 NOTE — ED PROVIDER NOTES
[facility] Emergency Department    Naomie Wick MD, am the primary clinician of record. CHIEF COMPLAINT  Chief Complaint   Patient presents with    Chest Pain     C/o CP that started today. States ran out of valium and thinks its related to anxiety. HISTORY OF PRESENT ILLNESS  Shasha Osorio is a 78 y.o. female  who presents to the ED complaining of self-reported central chest discomfort and generalized tightness in the chest.  She says it started a few hours ago. She self-reports that this was anxiety related and she is crying and tearful on her initial assessment. That being said she does have documented history of ventricular fibrillation with a defibrillator in place that has not fired, she also has a history of anxiety hypertension and hyperlipidemia. She also has a history of methadone and cocaine use in the past.  The patient tells me that she was recently admitted to the hospital last month for what was thought to be anxiety related chest pain after a negative work-up including a stress test.  The patient does ask me initially for refill of Valium. She tells me that she thinks her chest discomfort and tightness is related to her anxiety and she is tearful about it. She denies any suicidal homicidal thoughts or hallucinations. She does not feel short of breath and has not any cough or cold symptoms. No belly pain vomiting or diarrhea although has had some nausea. No other complaints, modifying factors or associated symptoms. I have reviewed the following from the nursing documentation.     Past Medical History:   Diagnosis Date    Anxiety     Chronic kidney disease, stage 3 (HCC)     Dr. Danielle Mercado, awaiting bloodwork results    Chronic neck pain     Drug abuse ([facility] 08/16/2014    Methadone on drug screen / History Cocaine    GERD (gastroesophageal reflux disease)     Glucose intolerance (impaired glucose tolerance) 04/12/2013    Hiatal hernia Hyperlipidemia     Hypertension     White Coat    Opiate addiction ([facility]     Ventricular fibrillation (HCC)     S/P implanted defibrillator    White coat hypertension      Past Surgical History:   Procedure Laterality Date    A-V CARDIAC PACEMAKER INSERTION  12/2011    CERVICAL FUSION  2010    CHOLECYSTECTOMY  6/2012    LAPAROTOMY  2010    WISDOM TOOTH EXTRACTION       Family History   Problem Relation Age of Onset    Diabetes Mother     High Blood Pressure Mother     Heart Disease Mother         Arrhythmia    Cancer Father         Lung Cancer    High Blood Pressure Sister     Asthma Maternal Aunt     High Blood Pressure Maternal Aunt     Depression Maternal Grandfather         Suicide     Social History     Socioeconomic History    Marital status:      Spouse name: Not on file    Number of children: 3    Years of education: Not on file    Highest education level: Not on file   Occupational History    Occupation: Disability   Tobacco Use    Smoking status: Never    Smokeless tobacco: Never   Substance and Sexual Activity    Alcohol use: No    Drug use: No     Comment: off opiates 2 years    Sexual activity: Not on file   Other Topics Concern    Not on file   Social History Narrative    Not on file     Social Determinants of Health     Financial Resource Strain: Not on file   Food Insecurity: Not on file   Transportation Needs: Not on file   Physical Activity: Not on file   Stress: Not on file   Social Connections: Not on file   Intimate Partner Violence: Not on file   Housing Stability: Not on file     No current facility-administered medications for this encounter. Current Outpatient Medications   Medication Sig Dispense Refill    gabapentin (NEURONTIN) 600 MG tablet Take 600 mg by mouth 2 times daily.  Per [facility], filled 9/8/22      atorvastatin (LIPITOR) 40 MG tablet Take 40 mg by mouth daily Per [address], filled 9/9/22      lisinopril-hydroCHLOROthiazide (PRINZIDE;ZESTORETIC) 20-25 MG per tablet Take 1 tablet by mouth daily Per [facility]      cloNIDine (CATAPRES) 0.3 MG tablet Take 1 tablet by mouth 2 times daily. 60 tablet 5     Allergies   Allergen Reactions    Darvocet [Propoxyphene N-Acetaminophen]     Sulfa Antibiotics        REVIEW OF SYSTEMS  10 systems reviewed, pertinent positives per HPI otherwise noted to be negative. PHYSICAL EXAM  BP (!) 161/91   Pulse (!) 108   Temp 98.2 °F (36.8 °C) (Oral)   Resp 20   Ht 5' 5\" (1.651 m)   Wt 160 lb (72.6 kg)   SpO2 96%   BMI 26.63 kg/m²    GENERAL APPEARANCE: Awake and alert. Cooperative. No distress. Tearful anxious and crying. HENT: Normocephalic. Atraumatic. Mucous membranes are dry. NECK: Supple. EYES: PERRL. EOM's grossly intact. HEART/CHEST: Reg rhythm mild tachycardia. No murmurs. No chest wall tenderness. LUNGS: Respirations unlabored. CTAB. Good air exchange. Speaking comfortably in full sentences. ABDOMEN: No tenderness. Soft. Non-distended. No masses. No organomegaly. No guarding or rebound. Normal bowel sounds throughout. MUSCULOSKELETAL: No extremity edema. Compartments soft. No deformity. No tenderness in the extremities. All extremities neurovascularly intact. SKIN: Warm and dry. No acute rashes. NEUROLOGICAL: Alert and oriented. CN's 2-12 intact. No gross facial drooping. Strength 5/5, sensation intact. 2 plus DTR's in knees bilaterally. Gait normal.  PSYCHIATRIC: Anxious, flight of ideas    LABS  I have reviewed all labs for this visit.    Results for orders placed or performed during the hospital encounter of 11/25/22   CBC with Auto Differential   Result Value Ref Range    WBC 9.6 4.0 - 11.0 K/uL    RBC 4.87 4.00 - 5.20 M/uL    Hemoglobin 14.6 12.0 - 16.0 g/dL    Hematocrit 42.9 36.0 - 48.0 %    MCV 88.1 80.0 - 100.0 fL    MCH 30.0 26.0 - 34.0 pg    MCHC 34.0 31.0 - 36.0 g/dL    RDW 13.3 12.4 - 15.4 %    Platelets 579 (H) 665 - 450 K/uL    MPV 8.4 5.0 - 10.5 fL    Neutrophils % 69.6 % Lymphocytes % 19.3 %    Monocytes % 10.3 %    Eosinophils % 0.1 %    Basophils % 0.7 %    Neutrophils Absolute 6.7 1.7 - 7.7 K/uL    Lymphocytes Absolute 1.9 1.0 - 5.1 K/uL    Monocytes Absolute 1.0 0.0 - 1.3 K/uL    Eosinophils Absolute 0.0 0.0 - 0.6 K/uL    Basophils Absolute 0.1 0.0 - 0.2 K/uL   Comprehensive Metabolic Panel w/ Reflex to MG   Result Value Ref Range    Sodium 139 136 - 145 mmol/L    Potassium reflex Magnesium 3.7 3.5 - 5.1 mmol/L    Chloride 101 99 - 110 mmol/L    CO2 20 (L) 21 - 32 mmol/L    Anion Gap 18 (H) 3 - 16    Glucose 163 (H) 70 - 99 mg/dL    BUN 19 7 - 20 mg/dL    Creatinine 1.1 0.6 - 1.2 mg/dL    Est, Glom Filt Rate 55 (A) >60    Calcium 10.4 8.3 - 10.6 mg/dL    Total Protein 8.1 6.4 - 8.2 g/dL    Albumin 4.4 3.4 - 5.0 g/dL    Albumin/Globulin Ratio 1.2 1.1 - 2.2    Total Bilirubin 0.7 0.0 - 1.0 mg/dL    Alkaline Phosphatase 65 40 - 129 U/L    ALT 21 10 - 40 U/L    AST 17 15 - 37 U/L   Troponin   Result Value Ref Range    Troponin <0.01 <0.01 ng/mL   Brain Natriuretic Peptide   Result Value Ref Range    Pro- (H) 0 - 124 pg/mL   EKG 12 Lead   Result Value Ref Range    Ventricular Rate 115 BPM    Atrial Rate 115 BPM    P-R Interval 128 ms    QRS Duration 68 ms    Q-T Interval 446 ms    QTc Calculation (Bazett) 616 ms    P Axis 13 degrees    R Axis 24 degrees    T Axis 40 degrees    Diagnosis       Sinus tachycardiaNonspecific ST and T wave abnormalityAbnormal ECG       The 12 lead EKG was interpreted by me independent of a cardiologist as follows:  Rate: tachycardia with a rate of 115  Rhythm: sinus  Axis: normal  Intervals: normal NE, narrow QRS, normal QTc  ST segments: no ST elevations or depressions  T waves: no abnormal inversions  Non-specific T wave changes: present  Prior EKG comparison: EKG dated 9/29/22 is not significantly different    RADIOLOGY    XR CHEST PORTABLE    Result Date: 11/25/2022  EXAMINATION: ONE XRAY VIEW OF THE CHEST 11/25/2022 9:35 am COMPARISON: 09/28/2022 at 2234 hours HISTORY: ORDERING SYSTEM PROVIDED HISTORY: anxious, chest pain TECHNOLOGIST PROVIDED HISTORY: Reason for exam:->anxious, chest pain Reason for Exam: Chest Pain (C/o CP that started today. States ran out of valium and thinks its related to anxiety. ) FINDINGS: Portions of left basilar region obscured by pacemaker power pack. Intracardiac device lead wires demonstrates stable course and position in the AP projection. Visualized lung fields are clear. No detectable pleural effusion, pneumothorax, pulmonary edema, mediastinal widening. Stable, borderline cardiomegaly noted. Stable borderline cardiomegaly compared to 09/28/2022. Otherwise, radiographically nonacute portable chest.        ED COURSE/MDM  Patient seen and evaluated. Old records reviewed. Labs and imaging reviewed and results discussed with patient. After initial evaluation, differential diagnostic considerations included: acute coronary syndrome, pulmonary embolism, COPD/asthma, pneumonia, musculoskeletal, reflux/PUD/gastritis, pneumothorax, CHF, thoracic aortic dissection, anxiety    The patient's ED workup was notable for initial concern for self-reported panic attack/anxiety related chest tightness. She had a negative cardiac work-up last month with tightness. Nonetheless based on her history with CKD, some cardiac comorbidities and history of polysubstance abuse I did obtain work-up including an EKG that was nonischemic, and initial negative troponin. She was given aspirin and Vistaril. Her chest x-ray is clear and her other labs all look reassuring initially. Nonetheless, my intention was to reassess her, go over her labs, and consider repeat troponin versus cardiology consult versus additional diagnostics such as possibly CAT scan/toxicologic testing, or at minimum vital sign reassessment, as well as symptom reassessment.   Unfortunately she eloped from the emergency department prior to any reassessment being able to be performed and I did not get did get discuss any of her results with her. Is this patient to be included in the SEP-1 Core Measure? No   Exclusion criteria - the patient is NOT to be included for SEP-1 Core Measure due to: Infection is not suspected      During the patient's ED course, the patient was given:  Medications   hydrOXYzine pamoate (VISTARIL) capsule 50 mg (50 mg Oral Given 11/25/22 0946)   aspirin chewable tablet 324 mg (324 mg Oral Given 11/25/22 0944)        CLINICAL IMPRESSION  1. Eloped from emergency department    2. Anxiety state        Blood pressure (!) 161/91, pulse (!) 108, temperature 98.2 °F (36.8 °C), temperature source Oral, resp. rate 20, height 5' 5\" (1.651 m), weight 160 lb (72.6 kg), SpO2 96 %. DISPOSITION  [place] Anna Marie was eloped in unknown condition. DISCLAIMER: This chart was created using Dragon dictation software. Efforts were made by me to ensure accuracy, however some errors may be present due to limitations of this technology and occasionally words are not transcribed correctly.           Jarad Mckeon MD  11/25/22 3645

## 2022-11-26 LAB
EKG ATRIAL RATE: 115 BPM
EKG DIAGNOSIS: NORMAL
EKG P AXIS: 13 DEGREES
EKG P-R INTERVAL: 128 MS
EKG Q-T INTERVAL: 446 MS
EKG QRS DURATION: 68 MS
EKG QTC CALCULATION (BAZETT): 616 MS
EKG R AXIS: 24 DEGREES
EKG T AXIS: 40 DEGREES
EKG VENTRICULAR RATE: 115 BPM

## 2022-11-26 PROCEDURE — 93010 ELECTROCARDIOGRAM REPORT: CPT | Performed by: INTERNAL MEDICINE
